# Patient Record
Sex: FEMALE | Race: ASIAN | NOT HISPANIC OR LATINO | Employment: UNEMPLOYED
[De-identification: names, ages, dates, MRNs, and addresses within clinical notes are randomized per-mention and may not be internally consistent; named-entity substitution may affect disease eponyms.]

---

## 2017-08-05 ENCOUNTER — HEALTH MAINTENANCE LETTER (OUTPATIENT)
Age: 32
End: 2017-08-05

## 2018-09-26 ENCOUNTER — OFFICE VISIT (OUTPATIENT)
Dept: FAMILY MEDICINE | Facility: CLINIC | Age: 33
End: 2018-09-26
Payer: COMMERCIAL

## 2018-09-26 VITALS
SYSTOLIC BLOOD PRESSURE: 103 MMHG | RESPIRATION RATE: 20 BRPM | TEMPERATURE: 97.7 F | OXYGEN SATURATION: 98 % | HEART RATE: 84 BPM | DIASTOLIC BLOOD PRESSURE: 69 MMHG | BODY MASS INDEX: 27.09 KG/M2 | WEIGHT: 129.6 LBS

## 2018-09-26 DIAGNOSIS — Z32.00 PREGNANCY EXAMINATION OR TEST, PREGNANCY UNCONFIRMED: Primary | ICD-10-CM

## 2018-09-26 DIAGNOSIS — Z32.01 PREGNANCY TEST POSITIVE: ICD-10-CM

## 2018-09-26 LAB — HCG UR QL: POSITIVE

## 2018-09-26 RX ORDER — PNV NO.95/FERROUS FUM/FOLIC AC 28MG-0.8MG
1 TABLET ORAL DAILY
Qty: 100 TABLET | Refills: 3 | Status: SHIPPED | OUTPATIENT
Start: 2018-09-26

## 2018-09-26 NOTE — PATIENT INSTRUCTIONS
Your pregnancy test is positive today.    We will order a dating US.  That means we will confirm your due date.  Usually the baby is too small to check it's heart, brain, bones, sex, etc.  That will be later in pregnancy.    Daily prenatal vitamin.    We'll get you set up for the long new OB visit with nurse (Kasey) and an OB doctor.  We deliver at [facility].

## 2018-09-26 NOTE — PROGRESS NOTES
Subjective   Kendrick Jacobs is a 33 year old female with a history including chronic low back pain who presents for missed periods.    She is unsure when her last period was, saying it was sometime in  or July.  She usually gets her period around the 12th of the month, so if she had to guess, she would say 18, though she is quite uncertain about this.  With the missed periods, she took a pregnancy test at home which was positive.  She has 6 previous pregnancies, all term with living children, currently ages 9, 10, 11, 13, 14, and 18 - 4 sons and 2 daughters.  No history of miscarriages or abortions.  No history of gestational diabetes or preeclampsia.  She denies any nausea or vomiting, but she has noticed decreased appetite.  No vaginal bleeding.    Social: Non-smoker.    Objective   Vitals: /69  Pulse 84  Temp 97.7  F (36.5  C) (Oral)  Resp 20  Wt 129 lb 9.6 oz (58.8 kg)  LMP 2018 (Approximate)  SpO2 98%  Breastfeeding? No  BMI 27.09 kg/m2  General: Pleasant. Young woman. No distress.  Heart: Regular rate and rhythm. No murmurs, rubs, or gallops.  Lungs: Clear to auscultation bilaterally. No wheezes or crackles. Good air movement.  Psych: Appropriate grooming and hygiene. Speech normal rate. Appropriate mood and affect.    Labs:  Results for orders placed or performed in visit on 18   HCG Qualitative Urine (UPT)  ([facility])   Result Value Ref Range    HCG Qual Urine POSITIVE Negative       Assessment & Plan   Newly diagnosed pregnancy in 33-year-old .  Based on a very uncertain LMP of 18, she would be 10w6d today.  -- Ordered daily prenatal vitamins.  -- Dating US can be done at any time.  -- Establish appointment for initial OB visit.  -- No need for nausea medications.    Return to clinic for initial OB visit and dating US.

## 2018-09-26 NOTE — MR AVS SNAPSHOT
After Visit Summary   2018    Kendrick Jacobs    MRN: [id number]           Patient Information     Date Of Birth          1985        Visit Information        Provider Department      2018 3:30 PM Anat Ferro MD [facility]        Today's Diagnoses     Pregnancy examination or test, pregnancy unconfirmed    -  1    Pregnancy test positive          Care Instructions    Your pregnancy test is positive today.    We will order a dating US.  That means we will confirm your due date.  Usually the baby is too small to check it's heart, brain, bones, sex, etc.  That will be later in pregnancy.    Daily prenatal vitamin.    We'll get you set up for the long new OB visit with nurse (Kasey) and an OB doctor.  We deliver at [facility].          Follow-ups after your visit        Future tests that were ordered for you today     Open Future Orders        Priority Expected Expires Ordered    US OB < 14 Weeks Single Routine  2019            Who to contact     Please call your clinic at [phone] to:    Ask questions about your health    Make or cancel appointments    Discuss your medicines    Learn about your test results    Speak to your doctor            Additional Information About Your Visit        MyChart Information     eco4cloud is an electronic gateway that provides easy, online access to your medical records. With eco4cloud, you can request a clinic appointment, read your test results, renew a prescription or communicate with your care team.     To sign up for eco4cloud visit the website at [email or web address]   You will be asked to enter the access code listed below, as well as some personal information. Please follow the directions to create your username and password.     Your access code is: [email or web address]  Expires: 2018  4:55 PM     Your access code will  in 90 days. If you need help or a new code, please contact your [facility] or call [phone] for assistance.        Care EveryWhere ID     This is your Care EveryWhere ID. This could be used by other organizations to access your [place] medical records  [id number]        Your Vitals Were     Pulse Temperature Respirations Last Period Pulse Oximetry Breastfeeding?    84 97.7  F (36.5  C) (Oral) 20 07/12/2018 (Approximate) 98% No    BMI (Body Mass Index)                   27.09 kg/m2            Blood Pressure from Last 3 Encounters:   09/26/18 103/69   12/31/14 103/72   05/29/13 115/82    Weight from Last 3 Encounters:   09/26/18 129 lb 9.6 oz (58.8 kg)   12/31/14 131 lb 9.6 oz (59.7 kg)   05/29/13 120 lb 11.2 oz (54.7 kg)              We Performed the Following     HCG Qualitative Urine (UPT)  ([facility])          Today's Medication Changes          These changes are accurate as of 9/26/18  4:55 PM.  If you have any questions, ask your nurse or doctor.               Start taking these medicines.        Dose/Directions    Prenatal Vitamin 27-0.8 MG Tabs   Used for:  Pregnancy test positive   Started by:  Anat Ferro MD        Dose:  1 tablet   Take 1 tablet by mouth daily   Quantity:  100 tablet   Refills:  3         Stop taking these medicines if you haven't already. Please contact your care team if you have questions.     levonorgestrel 0.75 MG tablet   Commonly known as:  PLAN B   Stopped by:  Anat Ferro MD                Where to get your medicines      These medications were sent to [facility] - [place], [place] - [address], [place] [address]     Phone:  [phone]     Prenatal Vitamin 27-0.8 MG Tabs                Primary Care Provider    Mira PANCHAL MD       [address] [facility] [address]        Equal Access to Services     URI GUNN AH: Karrie Sherman, watonida lusilvia, qaybta kaalmalucien lira, ana townsend. So [facility] [phone].    ATENCIÓN:  Si habla zane, tiene a ansari disposición servicios gratuitos de asistencia lingüística. Mekhi hicks [phone].    We comply with applicable federal civil rights laws and [place] laws. We do not discriminate on the basis of race, color, national origin, age, disability, sex, sexual orientation, or gender identity.            Thank you!     Thank you for choosing [facility]  for your care. Our goal is always to provide you with excellent care. Hearing back from our patients is one way we can continue to improve our services. Please take a few minutes to complete the written survey that you may receive in the mail after your visit with us. Thank you!             Your Updated Medication List - Protect others around you: Learn how to safely use, store and throw away your medicines at www.disposemymeds.org.          This list is accurate as of 9/26/18  4:55 PM.  Always use your most recent med list.                   Brand Name Dispense Instructions for use Diagnosis    Prenatal Vitamin 27-0.8 MG Tabs     100 tablet    Take 1 tablet by mouth daily    Pregnancy test positive

## 2018-10-01 DIAGNOSIS — Z32.01 PREGNANCY TEST POSITIVE: ICD-10-CM

## 2018-10-02 NOTE — PROGRESS NOTES
US OB <14 WK 10/1/18    CONCLUSION:  1. Single living intrauterine pregnancy at 10 weeks 1 day with EDC of 4/28/19.

## 2018-10-16 ENCOUNTER — HOSPITAL ENCOUNTER (OUTPATIENT)
Dept: ULTRASOUND IMAGING | Facility: CLINIC | Age: 33
Discharge: HOME | End: 2018-10-16
Attending: FAMILY MEDICINE

## 2018-10-16 ENCOUNTER — TELEPHONE (OUTPATIENT)
Dept: FAMILY MEDICINE | Facility: CLINIC | Age: 33
End: 2018-10-16

## 2018-10-16 ENCOUNTER — RECORDS - HEALTHEAST (OUTPATIENT)
Dept: ADMINISTRATIVE | Facility: OTHER | Age: 33
End: 2018-10-16

## 2018-10-16 ENCOUNTER — OFFICE VISIT (OUTPATIENT)
Dept: FAMILY MEDICINE | Facility: CLINIC | Age: 33
End: 2018-10-16
Payer: COMMERCIAL

## 2018-10-16 VITALS
OXYGEN SATURATION: 100 % | WEIGHT: 130.6 LBS | BODY MASS INDEX: 27.3 KG/M2 | DIASTOLIC BLOOD PRESSURE: 61 MMHG | HEART RATE: 86 BPM | RESPIRATION RATE: 20 BRPM | SYSTOLIC BLOOD PRESSURE: 95 MMHG | TEMPERATURE: 97.7 F

## 2018-10-16 DIAGNOSIS — O46.90 VAGINAL BLEEDING DURING PREGNANCY: ICD-10-CM

## 2018-10-16 DIAGNOSIS — O46.90 VAGINAL BLEEDING DURING PREGNANCY: Primary | ICD-10-CM

## 2018-10-16 LAB — B-HCG SERPL-ACNC: ABNORMAL MLU/ML (ref 0–4)

## 2018-10-16 NOTE — TELEPHONE ENCOUNTER
"Pt states that last night she had very mild abd cramping.  She woke up this morning and felt discharge but when looked had bright red vaginal bleeding.  She passed a \"fist\" sized blood clot in the toilet and bleeding has since stopped.  She is 12 weeks pregnant with FAMILIA 4/28/19 based on 10w1d ultrasound done on 10/1/18.  Appt made for today at 1:10 PM with Dr Ferro.  Routed note to Dr Ferro and Dr Klein./NG  "

## 2018-10-16 NOTE — PATIENT INSTRUCTIONS
Step 1: Is this a miscarriage?  -- Ultrasound.  -- Blood test today: hormone level to get a baseline.  On Thursday, come back to lab, recheck that level.  If it's going up, that's what we expect with a continued pregnancy.  If it's going down, then that's a sign of a miscarriage.    Step 2: If it is miscarriage ...  3 options:  -- Wait it out.  In a few weeks, we check your hormone level again to make sure it's gone down to 0.  -- Medication or surgery/procedure if there's tissue that's not all passing (continued bleeding, cramping, hormone level not going down to zero).    OB ULTRASOUND < 14 WEEKS  [id number] [facility]  [place] [address]  [phone]  Date:  Tuesday October 16, 2018  Time:  3:15 PM  Please have a full bladder by drinking 32 ounces of water and refrain from using the bathroom 1 hour prior to appointment.  Patient aware of appointment.  Kimberly Roblero  10/16/18

## 2018-10-16 NOTE — PROGRESS NOTES
Subjective   Kendrick Jacobs is a 33 year old  female at ~13w5d with a history including chronic low back pain who presents with vaginal bleeding in pregnancy.    I last saw her 3 weeks ago on 18, at which time a pregnancy test was positive.  Based on a very uncertain LMP of 18, she would be at 13w5d today.    Starting 4 days ago, she developed a sharp pain in her right lower abdomen.  It was brief, but since then she has had a sensation of a baby kicking her in the lower abdomen.  It is nonpainful.   This morning she awoke to find vaginal bleeding, soaking her bed sheets.  She passed a fist size clots.  She has not had any bleeding since then.  No abdominal pain or cramping with this.  No fevers.  No abdominal trauma, and she has never had any pregnancy loss before.    Social: Non-smoker.    Objective   Vitals: BP 95/61  Pulse 86  Temp 97.7  F (36.5  C) (Oral)  Resp 20  Wt 130 lb 9.6 oz (59.2 kg)  LMP 2018 (Approximate)  SpO2 100%  BMI 27.3 kg/m2  General: Pleasant. Young woman. No distress.  Heart: Regular rate and rhythm. No murmurs, rubs, or gallops.  Lungs: Clear to auscultation bilaterally. No wheezes or crackles. Good air movement.  GI: Abdomen normal to inspection. No ridigidity, distension, or guarding. Normoactive bowel sounds. Soft and non-tender to palpation throughout abdomen.  Pregnancy: Unable to detect fetal heart tones.    Assessment & Plan   33 year old  at a very uncertain 13w5d gestation, presenting with vaginal bleeding, including passage of a fist-sized clot.  Spontaneous  is of greatest concern.  -- Check blood type.  -- Get baseline beta-HCG, then repeat in 48 hours.  -- Ultrasound to evaluate fetal viability.    Return to clinic as scheduled later this week for follow-up.

## 2018-10-16 NOTE — MR AVS SNAPSHOT
After Visit Summary   10/16/2018    Kendrick Jacobs    MRN: [id number]           Patient Information     Date Of Birth          1985        Visit Information        Provider Department      10/16/2018 1:10 PM Anat Ferro MD [facility]        Today's Diagnoses     Vaginal bleeding during pregnancy    -  1      Care Instructions    Step 1: Is this a miscarriage?  -- Ultrasound.  -- Blood test today: hormone level to get a baseline.  On Thursday, come back to lab, recheck that level.  If it's going up, that's what we expect with a continued pregnancy.  If it's going down, then that's a sign of a miscarriage.    Step 2: If it is miscarriage ...  3 options:  -- Wait it out.  In a few weeks, we check your hormone level again to make sure it's gone down to 0.  -- Medication or surgery/procedure if there's tissue that's not all passing (continued bleeding, cramping, hormone level not going down to zero).          Follow-ups after your visit        Your next 10 appointments already scheduled     Oct 19, 2018  1:00 PM CDT   Education with [facility] Ob Educator   [facility] ([facility])    [address] [place] [address] [phone]            Oct 19, 2018  2:30 PM CDT   NEW OB with Mira PANCHAL MD   [facility] ([facility])    [address] [place] [address] [phone]              Future tests that were ordered for you today     Open Future Orders        Priority Expected Expires Ordered    US OB <14 WKS SINGLE OR FIRST GESTATION Routine  10/16/2019 10/16/2018            Who to contact     Please call your clinic at [phone] to:    Ask questions about your health    Make or cancel appointments    Discuss your medicines    Learn about your test results    Speak to your doctor            Additional Information About Your Visit        MyChart Information     eGistics is an electronic gateway that provides easy, online access to your medical records. With  MiMediahart, you can request a clinic appointment, read your test results, renew a prescription or communicate with your care team.     To sign up for [employer] visit the website at [email or web address]   You will be asked to enter the access code listed below, as well as some personal information. Please follow the directions to create your username and password.     Your access code is: [email or web address]  Expires: 2018  4:55 PM     Your access code will  in 90 days. If you need help or a new code, please contact your [facility] or call [phone] for assistance.        Care EveryWhere ID     This is your Care EveryWhere ID. This could be used by other organizations to access your [place] medical records  [id number]        Your Vitals Were     Pulse Temperature Respirations Last Period Pulse Oximetry BMI (Body Mass Index)    86 97.7  F (36.5  C) (Oral) 20 2018 (Approximate) 100% 27.3 kg/m2       Blood Pressure from Last 3 Encounters:   10/16/18 95/61   18 103/69   14 103/72    Weight from Last 3 Encounters:   10/16/18 130 lb 9.6 oz (59.2 kg)   18 129 lb 9.6 oz (58.8 kg)   14 131 lb 9.6 oz (59.7 kg)              We Performed the Following     ABO/Rh Type-HML ([facility])     Beta-HCG Quantitative ([facility])        Primary Care Provider    Mira PANCHAL MD       [address] [place] [address]        Equal Access to Services     SONIYA GUNN : Hadii margareth cuevaso Somarisa, waaxda luqadaha, qaybta kaalmada ana lira. So [facility] [phone].    ATENCIÓN: Si habla español, tiene a ansari disposición servicios gratuitos de asistencia lingüística. Llame al [phone].    We comply with applicable federal civil rights laws and [place] laws. We do not discriminate on the basis of race, color, national origin, age, disability, sex, sexual orientation, or gender identity.            Thank  you!     Thank you for choosing [facility]  for your care. Our goal is always to provide you with excellent care. Hearing back from our patients is one way we can continue to improve our services. Please take a few minutes to complete the written survey that you may receive in the mail after your visit with us. Thank you!             Your Updated Medication List - Protect others around you: Learn how to safely use, store and throw away your medicines at www.disposemymeds.org.          This list is accurate as of 10/16/18  1:49 PM.  Always use your most recent med list.                   Brand Name Dispense Instructions for use Diagnosis    Prenatal Vitamin 27-0.8 MG Tabs     100 tablet    Take 1 tablet by mouth daily    Pregnancy test positive

## 2018-10-17 DIAGNOSIS — O46.90 VAGINAL BLEEDING DURING PREGNANCY: ICD-10-CM

## 2018-10-17 LAB
ABO + RH BLD: NORMAL
REPEAT ABO/RH TYPING (HML): NORMAL

## 2018-10-18 NOTE — PROGRESS NOTES
US OB 10/16/18    CONCLUSION:  1. Single living intrauterine pregnancy at 12 weeks and 2 days.  EDC 4/28/19.  2. Hypoechoic region along the mid uterus is probably a small uterine fibroid.  Subchorionic hemorrhage favored less likely.

## 2018-10-19 ENCOUNTER — TELEPHONE (OUTPATIENT)
Dept: FAMILY MEDICINE | Facility: CLINIC | Age: 33
End: 2018-10-19

## 2018-10-22 DIAGNOSIS — O46.90 VAGINAL BLEEDING DURING PREGNANCY: ICD-10-CM

## 2018-10-22 LAB — B-HCG SERPL-ACNC: ABNORMAL MLU/ML (ref 0–4)

## 2018-10-23 ENCOUNTER — OFFICE VISIT (OUTPATIENT)
Dept: FAMILY MEDICINE | Facility: CLINIC | Age: 33
End: 2018-10-23
Payer: COMMERCIAL

## 2018-10-23 ENCOUNTER — TELEPHONE (OUTPATIENT)
Dept: FAMILY MEDICINE | Facility: CLINIC | Age: 33
End: 2018-10-23

## 2018-10-23 VITALS
RESPIRATION RATE: 16 BRPM | SYSTOLIC BLOOD PRESSURE: 93 MMHG | HEART RATE: 71 BPM | OXYGEN SATURATION: 99 % | BODY MASS INDEX: 27.59 KG/M2 | DIASTOLIC BLOOD PRESSURE: 61 MMHG | WEIGHT: 132 LBS | TEMPERATURE: 98 F

## 2018-10-23 DIAGNOSIS — N93.9 VAGINAL BLEEDING: Primary | ICD-10-CM

## 2018-10-23 DIAGNOSIS — D62 ANEMIA DUE TO BLOOD LOSS, ACUTE: ICD-10-CM

## 2018-10-23 DIAGNOSIS — Z23 NEED FOR VACCINATION: ICD-10-CM

## 2018-10-23 LAB
% GRANULOCYTES: 70.9 %G (ref 40–75)
GRANULOCYTES #: 5.2 K/UL (ref 1.6–8.3)
HCT VFR BLD AUTO: 30.6 % (ref 35–47)
HEMOGLOBIN: 9.3 G/DL (ref 11.7–15.7)
LYMPHOCYTES # BLD AUTO: 1.7 K/UL (ref 0.8–5.3)
LYMPHOCYTES NFR BLD AUTO: 22.9 %L (ref 20–48)
MCH RBC QN AUTO: 22.5 PG (ref 26.5–35)
MCHC RBC AUTO-ENTMCNC: 30.4 G/DL (ref 32–36)
MCV RBC AUTO: 73.9 FL (ref 78–100)
MID #: 0.5 K/UL (ref 0–2.2)
MID %: 6.2 %M (ref 0–20)
PLATELET # BLD AUTO: 293 K/UL (ref 150–450)
RBC # BLD AUTO: 4.1 M/UL (ref 3.8–5.2)
WBC # BLD AUTO: 7.4 K/UL (ref 4–11)

## 2018-10-23 ASSESSMENT — ANXIETY QUESTIONNAIRES
5. BEING SO RESTLESS THAT IT IS HARD TO SIT STILL: NOT AT ALL
3. WORRYING TOO MUCH ABOUT DIFFERENT THINGS: SEVERAL DAYS
1. FEELING NERVOUS, ANXIOUS, OR ON EDGE: SEVERAL DAYS
6. BECOMING EASILY ANNOYED OR IRRITABLE: SEVERAL DAYS
IF YOU CHECKED OFF ANY PROBLEMS ON THIS QUESTIONNAIRE, HOW DIFFICULT HAVE THESE PROBLEMS MADE IT FOR YOU TO DO YOUR WORK, TAKE CARE OF THINGS AT HOME, OR GET ALONG WITH OTHER PEOPLE: SOMEWHAT DIFFICULT
2. NOT BEING ABLE TO STOP OR CONTROL WORRYING: SEVERAL DAYS
7. FEELING AFRAID AS IF SOMETHING AWFUL MIGHT HAPPEN: NOT AT ALL
GAD7 TOTAL SCORE: 5

## 2018-10-23 ASSESSMENT — PATIENT HEALTH QUESTIONNAIRE - PHQ9: 5. POOR APPETITE OR OVEREATING: SEVERAL DAYS

## 2018-10-23 NOTE — PROGRESS NOTES
Preceptor Attestation:   Patient seen, evaluated and discussed with the resident. I have verified the content of the note, which accurately reflects my assessment of the patient and the plan of care.   Supervising Physician:  Josselin Jeffrey MD

## 2018-10-23 NOTE — TELEPHONE ENCOUNTER
Pt is +13 weeks and states that she took her kids to school and then went back to bed.  She states that she woke up and noticed bright red vaginal bleeding.  She states that she then voided in the toilet and had passed 2 golf ball size blood clots.  She denies cramping.  This is the second episode of vaginal bleeding during this pregnancy with the first one being on 10/16-10/17 where she had passed a fist size blood clot.  Her quant on 10/16 was 56,896 and repeat yesterday was 61,166.  Her OB ultrasound on 10/16/18 showed single living IUP.  There was a hypoechoic area along the mid uterus that radiologist says is a small uterine fibroid with a subch hemorrhage less likely.  Told pt that she should come in for eval and appt made for today at 10:20 am with Dr Perales./JOVAN

## 2018-10-23 NOTE — PATIENT INSTRUCTIONS
Subchorionic hemorhage as one possibility however this is unclear and there was a fibroid (muscle mass on the uterus.)    1. Blood draw today  2. Recheck US in about 1.5 weeks  3. Keep new OB  4. Baby seen today with a heartbeat            Image from Up To Date. This is NOT your image.

## 2018-10-23 NOTE — MR AVS SNAPSHOT
After Visit Summary   10/23/2018    Kendrick Jacobs    MRN: [id number]           Patient Information     Date Of Birth          1985        Visit Information        Provider Department      10/23/2018 10:20 AM Merissa Perales MD [facility]        Today's Diagnoses     Vaginal bleeding    -  1      Care Instructions    Subchorionic hemorhage as one possibility however this is unclear and there was a fibroid (muscle mass on the uterus.)    1. Blood draw today  2. Recheck US in about 1.5 weeks  3. Keep new OB  4. Baby seen today with a heartbeat            Image from Up To Date. This is NOT your image.           Follow-ups after your visit        Your next 10 appointments already scheduled     Nov 08, 2018  1:00 PM CST   Education with [facility] Ob Educator   [facility] ([facility])    [address] [place] [id number] [phone]            Nov 08, 2018  2:30 PM CST   NEW OB with Mira PANCHAL MD   [facility] ([facility])    [address] [place] [address]   [phone]              Future tests that were ordered for you today     Open Future Orders        Priority Expected Expires Ordered    US OB <14 WKS SINGLE OR FIRST GESTATION Routine 10/31/2018 10/23/2019 10/23/2018            Who to contact     Please call your clinic at [phone] to:    Ask questions about your health    Make or cancel appointments    Discuss your medicines    Learn about your test results    Speak to your doctor            Additional Information About Your Visit        MyChart Information     Foodlvet is an electronic gateway that provides easy, online access to your medical records. With Heidi Shaulis, you can request a clinic appointment, read your test results, renew a prescription or communicate with your care team.     To sign up for [employer] visit the website at [email or web address]   You will be asked to enter the access code listed below, as well as some personal  information. Please follow the directions to create your username and password.     Your access code is: [email or web address]  Expires: 2018  4:55 PM     Your access code will  in 90 days. If you need help or a new code, please contact your [facility] or call [phone] for assistance.        Care EveryWhere ID     This is your Care EveryWhere ID. This could be used by other organizations to access your [place] medical records  [id number]        Your Vitals Were     Pulse Temperature Respirations Last Period Pulse Oximetry BMI (Body Mass Index)    71 98  F (36.7  C) (Oral) 16 2018 (Approximate) 99% 27.59 kg/m2       Blood Pressure from Last 3 Encounters:   10/23/18 93/61   10/16/18 95/61   18 103/69    Weight from Last 3 Encounters:   10/23/18 132 lb (59.9 kg)   10/16/18 130 lb 9.6 oz (59.2 kg)   18 129 lb 9.6 oz (58.8 kg)              We Performed the Following     CBC with Diff Plt (P FM)        Primary Care Provider    Mira PANCHAL MD       [address] [address]        Equal Access to Services     SONIYA GUNN : Hadii margareth ku hadasho Soomaali, waaxda luqadaha, qaybta kaalmada adeegyada, ana keller . So [facility] [phone].    ATENCIÓN: Si habla español, tiene a ansari disposición servicios gratuitos de asistencia lingüística. Llame al [phone].    We comply with applicable federal civil rights laws and [place] laws. We do not discriminate on the basis of race, color, national origin, age, disability, sex, sexual orientation, or gender identity.            Thank you!     Thank you for choosing [facility]  for your care. Our goal is always to provide you with excellent care. Hearing back from our patients is one way we can continue to improve our services. Please take a few minutes to complete the written survey that you may receive in the mail after your visit with us. Thank you!              Your Updated Medication List - Protect others around you: Learn how to safely use, store and throw away your medicines at www.disposemymeds.org.          This list is accurate as of 10/23/18 11:16 AM.  Always use your most recent med list.                   Brand Name Dispense Instructions for use Diagnosis    Prenatal Vitamin 27-0.8 MG Tabs     100 tablet    Take 1 tablet by mouth daily    Pregnancy test positive

## 2018-10-23 NOTE — NURSING NOTE
Injectable influenza vaccine documentation    1. Has the patient received the information for the influenza vaccine? YES    2. Does the patient have a severe allergy to eggs (Patients with a severe egg allergy should be assessed by a medical provider, RN, or clinical pharmacist. If they receive the influenza vaccine, please have them observed for 15 minutes.)? No    3. Has the patient had an allergic reaction to previous influenza vaccines? No    4. Has the patient had any severe allergic reactions to past influenza vaccines ? No       5. Does patient have a history of Guillain-[place] syndrome? No        Based on responses above, I administered the influenza vaccine.  Leyla Hankins, CMA

## 2018-10-24 PROBLEM — D62 ANEMIA DUE TO BLOOD LOSS, ACUTE: Status: ACTIVE | Noted: 2018-10-24

## 2018-10-24 RX ORDER — FERROUS SULFATE 325(65) MG
325 TABLET ORAL
Qty: 30 TABLET | Refills: 2 | Status: SHIPPED | OUTPATIENT
Start: 2018-10-24 | End: 2018-11-14

## 2018-10-24 ASSESSMENT — ANXIETY QUESTIONNAIRES: GAD7 TOTAL SCORE: 5

## 2018-10-24 ASSESSMENT — PATIENT HEALTH QUESTIONNAIRE - PHQ9: SUM OF ALL RESPONSES TO PHQ QUESTIONS 1-9: 2

## 2018-10-24 NOTE — PROGRESS NOTES
Called and discussed results. Iron supplement sent to pharmacy. Patient will follow up next week for first OB visit.   Merissa Perales, DO  PGY3

## 2018-10-29 DIAGNOSIS — O09.90 HIGH-RISK PREGNANCY, UNSPECIFIED TRIMESTER: Primary | ICD-10-CM

## 2018-10-29 DIAGNOSIS — O09.90 HIGH-RISK PREGNANCY, UNSPECIFIED TRIMESTER: ICD-10-CM

## 2018-10-30 NOTE — PROGRESS NOTES
Left message on voicemail regarding US. No results left on VM. Overall normal however placenta previa noted. Will discuss at next visit. This could have caused the vaginal bleeding and will be monitored during pregnancy.   Merissa Perales, DO  PGY3

## 2018-11-12 ENCOUNTER — OFFICE VISIT (OUTPATIENT)
Dept: FAMILY MEDICINE | Facility: CLINIC | Age: 33
End: 2018-11-12
Payer: COMMERCIAL

## 2018-11-12 ENCOUNTER — ALLIED HEALTH/NURSE VISIT (OUTPATIENT)
Dept: FAMILY MEDICINE | Facility: CLINIC | Age: 33
End: 2018-11-12
Payer: COMMERCIAL

## 2018-11-12 ENCOUNTER — MEDICAL CORRESPONDENCE (OUTPATIENT)
Dept: HEALTH INFORMATION MANAGEMENT | Facility: CLINIC | Age: 33
End: 2018-11-12

## 2018-11-12 VITALS
BODY MASS INDEX: 28.04 KG/M2 | HEART RATE: 89 BPM | HEIGHT: 58 IN | WEIGHT: 133.6 LBS | TEMPERATURE: 97.8 F | DIASTOLIC BLOOD PRESSURE: 61 MMHG | RESPIRATION RATE: 16 BRPM | SYSTOLIC BLOOD PRESSURE: 92 MMHG

## 2018-11-12 DIAGNOSIS — K59.03 DRUG-INDUCED CONSTIPATION: ICD-10-CM

## 2018-11-12 DIAGNOSIS — O09.90 SUPERVISION OF HIGH RISK PREGNANCY, ANTEPARTUM: ICD-10-CM

## 2018-11-12 DIAGNOSIS — Z11.51 SCREENING FOR HUMAN PAPILLOMAVIRUS: ICD-10-CM

## 2018-11-12 DIAGNOSIS — Z34.82 ENCOUNTER FOR SUPERVISION OF OTHER NORMAL PREGNANCY IN SECOND TRIMESTER: Primary | ICD-10-CM

## 2018-11-12 DIAGNOSIS — O99.019 IRON DEFICIENCY ANEMIA DURING PREGNANCY, ANTEPARTUM: ICD-10-CM

## 2018-11-12 DIAGNOSIS — O44.02 PLACENTA PREVIA ANTEPARTUM IN SECOND TRIMESTER: ICD-10-CM

## 2018-11-12 DIAGNOSIS — O28.3 ABNORMAL PREGNANCY US: ICD-10-CM

## 2018-11-12 DIAGNOSIS — O46.90 VAGINAL BLEEDING DURING PREGNANCY, ANTEPARTUM: ICD-10-CM

## 2018-11-12 DIAGNOSIS — O09.40 GRAND MULTIPARITY WITH CURRENT PREGNANCY: ICD-10-CM

## 2018-11-12 DIAGNOSIS — D64.9 ANEMIA, UNSPECIFIED TYPE: ICD-10-CM

## 2018-11-12 DIAGNOSIS — D62 ANEMIA DUE TO BLOOD LOSS, ACUTE: ICD-10-CM

## 2018-11-12 DIAGNOSIS — D50.9 IRON DEFICIENCY ANEMIA DURING PREGNANCY, ANTEPARTUM: ICD-10-CM

## 2018-11-12 LAB
BACTERIA: NORMAL
BILIRUBIN UR: NEGATIVE
BLOOD UR: NEGATIVE
CLUE CELLS: NORMAL
FERRITIN SERPL-MCNC: 6 NG/ML (ref 10–130)
GLUCOSE URINE: NEGATIVE
HCT VFR BLD AUTO: 32.3 % (ref 35–47)
HEMOGLOBIN: 9.5 G/DL (ref 11.7–15.7)
HIV 1+2 AB+HIV1 P24 AG SERPL QL IA: NEGATIVE
IRON SATN MFR SERPL: 7 % (ref 20–50)
IRON SERPL-MCNC: 31 UG/DL (ref 42–175)
KETONES UR QL: NEGATIVE
LEUKOCYTE ESTERASE UR: NEGATIVE
MCH RBC QN AUTO: 22.7 PG (ref 26.5–35)
MCHC RBC AUTO-ENTMCNC: 29.4 G/DL (ref 32–36)
MCV RBC AUTO: 77.3 FL (ref 78–100)
MOTILE TRICHOMONAS: NEGATIVE
NITRITE UR QL STRIP: NEGATIVE
ODOR: NORMAL
PH UR STRIP: 6 [PH] (ref 5–7)
PH WET PREP: NORMAL
PLATELET # BLD AUTO: 298 K/UL (ref 150–450)
PROTEIN UR: NEGATIVE
RBC # BLD AUTO: 4.2 M/UL (ref 3.8–5.2)
SP GR UR STRIP: >=1.03
TIBC SERPL-MCNC: 471 UG/DL (ref 313–563)
TRANSFERRIN SERPL-MCNC: 377 MG/DL (ref 212–360)
UROBILINOGEN UR STRIP-ACNC: NORMAL
WBC # BLD AUTO: 6.9 K/UL (ref 4–11)
WBC WET PREP: NORMAL
YEAST: NORMAL

## 2018-11-12 NOTE — PROGRESS NOTES
Preceptor Attestation:   Patient seen, evaluated and discussed with the resident. I have verified the content of the note, which accurately reflects my assessment of the patient and the plan of care.   Supervising Physician:  Thierry Reece MD

## 2018-11-12 NOTE — PROGRESS NOTES
Past Medical History     Do you have a history of any of the following medical conditions?    Condition No/Yes/Details   Hypertension No    Heart disease, mitral valve prolapse, rheumatic fever No    Asthma or another chronic lung disease No    An autoimmune disorder No    Kidney disease No    Frequent urinary tract infections No    Epilepsy, seizures, or spells No    Migraine headaches No    Stroke, loss of sensation/function, seizures, or other neuro problem No    Diabetes No    Thyroid problems or have you taken thyroid medication No    Hepatitis, liver disease, jaundice No    Blood clots, phlebitis, pulmonary embolism or varicose veins No    Excessive bleeding after surgery or dental work No    Do you have more bleeding than other women after a cut or a scratch? No    Anemia  YES during pregnancy taking PNV's and iron once a day   Blood transfusions No         Would you refuse a blood transfusion?       No   If yes, then ask next question. If no, skip next question.   Would you rather die than receive a blood transfusion? No    Breast problems No    Have you ever ? No - plans to bottle feed   Abnormalities of the uterus No    Abnormal pap smear No    Have you ever been treated for depression? No    Are you having problems with crying spells or loss of self-esteem? No    Have you ever required psychiatric care? No    Have you been physically, sexually, or emotionally hurt by someone? No    Have you been in a major accident or suffered serious trauma? No    Have you ever had any gynecological surgical procedures such as cervical conization, LEEP, laser treatment, cryosurgery of the cervix, or a dilatation and curettage? No    Have you had any other surgical procedures? No         Have you ever had any complications from anesthesia? No    Have you ever been hospitalized for a nonsurgical reason? No             Substance use and exposure     Does anyone in your home smoke? No    Do you use tobacco  products or betel nut? No    Do you drink beer, wine, or hard liquor? No    Do you use any of the following: marijuana, speed, cocaine, heroin, hallucinogens, or other drugs? No               Symptoms since Last Menstrual Period     Do you currently have any of the following symptoms: No/Yes/Details        Abdominal pain  YES uncomfortable bloating across abdomen at least once a day and lasts 1-2 hours at a time        Blood in the stools or urine No         Chest pain No         Shortness of breath No         coughing or vomiting up blood No         Your heart racing or skipping beats No         Nausea or vomiting No         Pain on urination No         Vaginal discharge or bleeding  YES 3 or 4 episodes of bright red vaginal bleeding last around 10/24               Genetic Screening          Is the patient 35 years or older? No      Do you have a history of any of the following No/Yes/Details        A metabolic disorder (e.g. Insulin-dependent DM, PKU) No         Recurrent pregnancy loss or still birth No      Do you, the baby's father, or anyone in your families have          Thalassemia AND MCV <80 No         Hemophilia No         Neural tube defect No }        Congenital heart defect No         Sickle cell disease or trait No         Muscular dystrophy No         Cystic fibrosis No         Mental retardation or autism No         Down's syndrome No         Clinton-Sach's disease No         [place]'s chorea No         Any other inherited genetic or chromosomal disorder No         A child with birth defects not listed above No                Infection History     Ever treated for tuberculosis or had a positive skin test No    Ever had genital herpes (or has your partner) No    Had a rash or viral illness since LMP No    Ever had a sexually transmitted infection No    Ever had chicken pox or the vaccine  YES had chicken pox   Have you had a sexual partner who is HIV positive No    Ever had any other serious  infectious disease No                Risk Assessment     Average Risk Category  No significant risk factors:Yes    At Risk Category (up to 3)  Teen pregnancy: No  Poor social situation: No  Domestic abuse: No  Financial difficulties: No  Smoker: No  H/O  deliver: No  H/O drug abuse: No  Non-English speaking: No  Advanced maternal age: No  GDM risks: No  Previous C/S: No  H/O PIH: No  H/O STIs: No  H/O mental health concerns: No  Onset care > 20 weeks: No      High Risk Category (4 or more At Risk or)  Diabetes/GDM: No  Multiple gestation: No  Chronic hypertension: No  Significant hx of asthma: No  Fetal demise > 20 weeks: No  Positive tox screen: No  Current mental health treatment: No  Other: Placenta Previa      Risk: High Risk   Date determined: 18

## 2018-11-12 NOTE — PROGRESS NOTES
First Obstetric Visit       HPI       Kendrick Jacobs is a 33 year old woman who presents for an initial prenatal visit at 16w1d weeks of pregnancy with FAMILIA of 2019 by LMP of Patient's last menstrual period was 2018 (approximate)..      She has had bleeding since her LMP.  The bleeding  was bright red, in large, on 4 occasions, and was not accompanied by cramping...   She has not had nausea.   Weight loss has not occurred.    This was not a planned pregnancy.     OTHER CONCERNS: Some abdominal bloating with tightening and it's very uncomfortable. Did not happen with prior pregnancies. Not associated with any particular foods. Becoming more frequent. Will walk around when it happens, and has tried stretching. Doesn't matter if she's full or hungry.     Have you travelled during the pregnancy?No  Have your sexual partner(s) travelled during the pregnancy?No              Labor Risk Assessment     Is the patient's age <18 or >40?     No  Patint's BMI is Body mass index is 27.68 kg/(m^2).   Does patient have a BMI < 18.5?     No  Prior delivery within 6 months?      No  Ever delivered prior to 37 weeks gestation?  No  Pregnancy occur via In Vitro Fertilization?   No  Are you carrying twins?       No    The patient has the following additional risk factors for  labor:   none     Labor Risk Summary:  Pt is high risk for  Labor  No                    Past Medical History     Past Medical History:   Diagnosis Date     NO ACTIVE PROBLEMS      See nurse history note, reviewed in detail.             Current Medications      Current Outpatient Prescriptions   Medication Sig Dispense Refill     ferrous sulfate (IRON) 325 (65 Fe) MG tablet Take 1 tablet (325 mg) by mouth daily (with breakfast) 30 tablet 2     Prenatal Vit-Fe Fumarate-FA (PRENATAL VITAMIN) 27-0.8 MG TABS Take 1 tablet by mouth daily 100 tablet 3             Review of Systems      ROS:  No - Headache  No - Changes in vision  No  "- Chest Pain  No - Shortness of Breath  No - Nausea   No - Vomiting  YES - Abdominal pain   No - Contractions  No - Dysuria   No - Vaginal Discharge    YES - Vaginal bleeding   No - Loss of Fluid   No - Extremity swelling     ====================================================           Physical Exam      BP 92/61  Pulse 89  Temp 97.8  F (36.6  C)  Resp 16  Ht 4' 10.25\" (148 cm)  Wt 133 lb 9.6 oz (60.6 kg)  LMP 07/12/2018 (Approximate)  BMI 27.68 kg/m2  GENERAL: healthy, alert and no distress  EYES: Eyes grossly normal to inspection, extraocular movements - intact, and PERRL  NECK: no tenderness, no adenopathy, no asymmetry, no masses, no stiffness; thyroid- normal to palpation  RESP: lungs clear to auscultation - no rales, no rhonchi, no wheezes  CV: regular rates and rhythm, normal S1 S2, no S3 or S4 and no murmur, no click or rub -  ABDOMEN: soft, no tenderness, no  hepatosplenomegaly, no masses, normal bowel sounds   MS: extremities- no gross deformities noted, no edema  SKIN: no suspicious lesions, no rashes  - female: cervix- normal, adnexae- normal; uterus- normal, no masses, no discharge  No scars noted.. -154  GENITALIA:  BUS WNL, no lesions noted   VAGINA:  Pink, normal rugae and discharge normal and physiologic  CERVIX:  smooth, without discharge or CMT and parous os,   firm/ closed 4 cm long.  UTERUS: Anteverted, nontender 15 weeks in size.  ADNEXA: Without masses or tenderness    Past labs reviewed:  A POS  Varicella immune No  Hepatitis B immune Yes  Last pap 2013.  She is due for this today.    =========================================         Assessment and Plan     Kendrick was seen today for prenatal care.    Diagnoses and all orders for this visit:    Encounter for supervision of other normal pregnancy in second trimester  -     ABO/Rh Type-HML ([email or web address])  -     Antibody Screen ([email or web address])  -     Chlamydia/Gono Amplified ([email or web address])  -     CBC with Plt ([facility])  -     Culture " Urine ([facility])  -     Hepatitis B Surface Ag ([facility])  -     HIV Ag/Ab Screen [place] ([facility])  -     Lead, Blood ([facility])  -     Rubella  IgG ([facility])  -     Syphilis Screen [place] ([facility])  -     Urinalysis([facility])  -     Prema Zoster Imm Status Franny ([facility])  -     Hepatitis B Surface Ab ([facility])  -     Glycosylated Hgb A1C ([facility])  -     GYN Cytology ([facility])  -     Wet Prep ([facility])    Anemia, unspecified type  -     Iron Transferrin Saturat ([facility])  -     Ferritin ([facility])    Screening for human papillomavirus  -     GYN Cytology ([facility])        33 year old  , 16w1d weeks of pregnancy with FAMILIA of 2019 by LMP of Patient's last menstrual period was 2018 (approximate)..      Pregnancy Risk Assessment: Average risk pregnancy  Discussed high risk conditions as follows: none    -Dating US obtained and dating confirmed?   YES  -Taking PNV/Folate?     YES      - Ordered new OB labs: blood type and antibody screen, HIV, VDRL, hep B, rubella, UA/UC, gonorrhea/chlamydia, Pap smear, Hepatitis B immunity, Varicella immunity and Wet prep done today.    -Discussed risks and benefits of second trimester quad screen fro trisomies, patient declined or was at too advanced a gestational age for testing.   - Did not discuss genetic screening. Plan to discuss at next visit.  - Discussed screening for sickle cell anemia. Patient does not  want to pursue Hb electrophoresis.     - Discussed early screening for gestational diabetes. The patient does have a history of GDM, BMI>30, h/o prediabetes/glucose intolerance, first degree relative with GDM or DM, or chronic HTN, so WILL obtain early GCT or A1c.    - The patient does not h/o severe, early preeclampsia with delivery <34weeks, preeclampsia in more than one pregnancy, pre-gestational diabetes, chronic hypertension, renal disease, or autoimmune disease so WILL NOT start low dose aspirin (81mg) and calcium  supplementation (1g-1.5g/day elemental = 2500mg- 3750mg/day Calcium carbonate) to prevent preeclampsia.    - The patient  does not have a history of spontaneous  birth so  WILL NOT consider progesterone starting at 16-20 weeks and/or serial transvaginal cervical length ultrasounds from 16-24 weeks.     - Prenatal vitamins ordered.  - Flu shot offered and was Accepted.    Counseling given:   - Follow up in 4 weeks for return OB visit.  - Recommended weight gain for pregnancy: 15-25 lbs (pregravid BMI 25-29.9)      - Instructed on best evidence for: healthy diet and foods to avoid; exercise and activity during pregnancy; avoiding exposure to toxoplasmosis; safe use of seatbelts during pregnancy; and maintenance of a generally healthy lifestyle  -Patient to see OB educator/ RN today and/or next visit.  - Try GasX for abdominal pain  - Ferrous sulfate for anemia  - Fetal scan in 4 weeks    Discussed the harms, benefits, side effects and alternative therapies for current prescribed and OTC medications.      There are no Patient Instructions on file for this visit.    Options for treatment and follow-up care were reviewed with the patient and/or guardian. Kendrick Jacobs and/or guardian engaged in the decision making process and verbalized understanding of the options discussed and agreed with the final plan.    Mira Klein MD PGY1

## 2018-11-12 NOTE — MR AVS SNAPSHOT
"              After Visit Summary   11/12/2018    Kendrick Jacobs    MRN: [id number]           Patient Information     Date Of Birth          1985        Visit Information        Provider Department      11/12/2018 2:30 PM Mira Klein MD [facility]        Today's Diagnoses     Encounter for supervision of other normal pregnancy in second trimester    -  1    Anemia, unspecified type        Screening for human papillomavirus        Drug-induced constipation        Anemia due to blood loss, acute           Follow-ups after your visit        Follow-up notes from your care team     Return in about 4 weeks (around 12/10/2018).      Your next 10 appointments already scheduled     Dec 10, 2018 11:20 AM CST   ULTRASOUND with Destini Rayningham   [facility] ([facility])    [address] [place] [id number]   [phone]            Dec 13, 2018  1:30 PM CST   RETURN OB with Mira PANCHAL MD   [facility] ([facility])    [address] [place] [address]   [phone]              Who to contact     Please call your clinic at [phone] to:    Ask questions about your health    Make or cancel appointments    Discuss your medicines    Learn about your test results    Speak to your doctor            Additional Information About Your Visit        Care EveryWhere ID     This is your Care EveryWhere ID. This could be used by other organizations to access your [facility] medical records  [id number]        Your Vitals Were     Pulse Temperature Respirations Height Last Period BMI (Body Mass Index)    89 97.8  F (36.6  C) 16 4' 10.25\" (148 cm) 07/12/2018 (Approximate) 27.68 kg/m2       Blood Pressure from Last 3 Encounters:   11/12/18 92/61   10/23/18 93/61   10/16/18 95/61    Weight from Last 3 Encounters:   11/12/18 133 lb 9.6 oz (60.6 kg)   10/23/18 132 lb (59.9 kg)   10/16/18 130 lb 9.6 oz (59.2 kg)              We Performed the Following     ABO/Rh Type-HML ([facility])     Antibody " Screen ([facility])     CBC with Plt ([facility])     Chlamydia/Gono Amplified ([facility])     Culture Urine ([facility])     Ferritin ([facility])     Glycosylated Hgb A1C ([facility])     GYN Cytology ([facility])     Hepatitis B Surface Ab ([facility])     Hepatitis B Surface Ag ([facility])     HIV Ag/Ab Screen [place] ([facility])     HPV Gay PCR ([facility])     Iron Transferrin Saturat ([facility])     Lead With Demographics ([facility])     Lead, Blood ([facility])     Rubella  IgG ([facility])     Syphilis Screen [place] ([facility])     Urinalysis([facility])     Prema Zoster Imm Status Franny ([facility])     Wet Prep ([facility])          Today's Medication Changes          These changes are accurate as of 11/12/18 11:59 PM.  If you have any questions, ask your nurse or doctor.               Start taking these medicines.        Dose/Directions    docusate sodium 100 MG tablet   Commonly known as:  COLACE   Used for:  Drug-induced constipation   Started by:  Mira Klein MD        Dose:  100 mg   Take 100 mg by mouth daily   Quantity:  60 tablet   Refills:  1         These medicines have changed or have updated prescriptions.        Dose/Directions    ferrous sulfate 325 (65 Fe) MG tablet   Commonly known as:  IRON   This may have changed:  when to take this   Used for:  Anemia due to blood loss, acute   Changed by:  Mira Klein MD        Dose:  325 mg   Take 1 tablet (325 mg) by mouth 2 times daily   Quantity:  30 tablet   Refills:  3            Where to get your medicines      These medications were sent to [facility] - [place], [place] - [address], [place] [address]     Phone:  [phone]     docusate sodium 100 MG tablet    ferrous sulfate 325 (65 Fe) MG tablet                Primary Care Provider    Mira PANCHAL MD       [address] [facility] [address]        Equal Access to Services     URI GUNN AH: Karrie Sherman  eula perkins, reneeta kaheidi lira, ana yoonin hayaan litosuki pippaso laSumaaaedda ah. So [facility] [phone].    ATENCIÓN: Si daniela zane, tiene a ansari disposición servicios gratuitos de asistencia lingüística. Llame al [phone].    We comply with applicable federal civil rights laws and [place] laws. We do not discriminate on the basis of race, color, national origin, age, disability, sex, sexual orientation, or gender identity.            Thank you!     Thank you for choosing [facility]  for your care. Our goal is always to provide you with excellent care. Hearing back from our patients is one way we can continue to improve our services. Please take a few minutes to complete the written survey that you may receive in the mail after your visit with us. Thank you!             Your Updated Medication List - Protect others around you: Learn how to safely use, store and throw away your medicines at www.disposemymeds.org.          This list is accurate as of 11/12/18 11:59 PM.  Always use your most recent med list.                   Brand Name Dispense Instructions for use Diagnosis    docusate sodium 100 MG tablet    COLACE    60 tablet    Take 100 mg by mouth daily    Drug-induced constipation       ferrous sulfate 325 (65 Fe) MG tablet    IRON    30 tablet    Take 1 tablet (325 mg) by mouth 2 times daily    Anemia due to blood loss, acute       Prenatal Vitamin 27-0.8 MG Tabs     100 tablet    Take 1 tablet by mouth daily    Pregnancy test positive

## 2018-11-13 PROBLEM — O46.90 VAGINAL BLEEDING DURING PREGNANCY, ANTEPARTUM: Status: ACTIVE | Noted: 2018-10-24

## 2018-11-13 PROBLEM — O44.02 PLACENTA PREVIA ANTEPARTUM IN SECOND TRIMESTER: Status: ACTIVE | Noted: 2018-10-29

## 2018-11-13 PROBLEM — O09.90 SUPERVISION OF HIGH RISK PREGNANCY, ANTEPARTUM: Status: ACTIVE | Noted: 2018-11-12

## 2018-11-13 PROBLEM — O09.40 GRAND MULTIPARITY WITH CURRENT PREGNANCY: Status: ACTIVE | Noted: 2018-11-12

## 2018-11-13 LAB
ABO + RH BLD: NORMAL
BLD GP AB SCN SERPL QL: NEGATIVE
C TRACH RRNA CVX QL NAA+PROBE: NEGATIVE
COLLECTION METHOD: NORMAL
CULTURE: NORMAL
FINAL DIAGNOSIS: ABNORMAL
HBA1C MFR BLD: 5.1 % (ref 4.2–6.1)
HBV SURFACE AB SER-ACNC: NEGATIVE M[IU]/ML
HBV SURFACE AG SERPL QL IA: NEGATIVE
HPV 16 DNA: NEGATIVE
HPV 18 DNA: NEGATIVE
HPV SOURCE: ABNORMAL
LEAD BLD-MCNC: NORMAL UG/DL
LEAD RETEST: NO
N GONORRHOEA RRNA SPEC QL NAA+PROBE: NEGATIVE
OTHER HR HPV: POSITIVE
REPEAT ABO/RH TYPING (HML): NORMAL
RUBV IGG SERPL QL IA: POSITIVE
SPECIMEN DESCRIPTION: ABNORMAL
TREPONEMA ANTIBODY (SYPHILIS): NEGATIVE

## 2018-11-14 PROBLEM — O99.019 IRON DEFICIENCY ANEMIA DURING PREGNANCY, ANTEPARTUM: Status: ACTIVE | Noted: 2018-11-12

## 2018-11-14 PROBLEM — D50.9 IRON DEFICIENCY ANEMIA DURING PREGNANCY, ANTEPARTUM: Status: ACTIVE | Noted: 2018-11-12

## 2018-11-14 LAB — VZV IGG SER QL IF: POSITIVE

## 2018-11-14 RX ORDER — ASPIRIN 81 MG
100 TABLET, DELAYED RELEASE (ENTERIC COATED) ORAL DAILY
Qty: 60 TABLET | Refills: 1 | Status: SHIPPED | OUTPATIENT
Start: 2018-11-14 | End: 2018-12-17

## 2018-11-14 RX ORDER — FERROUS SULFATE 325(65) MG
325 TABLET ORAL 2 TIMES DAILY
Qty: 30 TABLET | Refills: 3 | Status: SHIPPED | OUTPATIENT
Start: 2018-11-14 | End: 2019-02-25

## 2018-11-14 NOTE — NURSING NOTE
Family Medicine OB Education    I provided the following OB education to Kendrick Jacobs.    Discussed that Dr Klein should be the doctor that she sees for her prenatal visits and that Dr Klein will try hard to be the one to deliver her baby.  Discussed that if Dr Klein was unavailable that one of our other physicians would deliver the baby and that this could be a male or female provider.  Briefly discussed residency program and that multiple doctors would be present at time of delivery.  Sheet given and discussed fetal growth and development.  Sheet given and discussed warning signs with reasons to call clinic, L&D, or 24 hr HE  line with questions or concerns (phone numbers given).  Sheet given and discussed Tdap to be given after 27 weeks gestation and the importance of family members get immunized before delivery.  Proof of pregnancy completed and given to pt.  Gave pt preregistration form for hospital to complete.  See questionnaire and pregnancy risk assessment for further information in provider encounter.     Legal Screener completed and there were no concerns for government benefits, housing, or immigration.      Name of provider who requested the OB education: Dr Klein  Name of provider on site (faculty or community preceptor) at the time of performing the OB education: Dr Chevy Dhillon, RN BSN

## 2018-11-14 NOTE — PROGRESS NOTES
Called Kendrick Jacobs on 11/14/18 at 9:59 AM about iron deficiency anemia. She said that she would like to take her iron pills twice daily instead of doing an iron infusion. Discussed that this can cause constipation so sent colace to her pharmacy.    Mira Klein MD PGY1

## 2018-11-15 LAB — LEAD BLDV-MCNC: <2 UG/DL (ref 0–4.9)

## 2018-11-20 ENCOUNTER — RECORDS - HEALTHEAST (OUTPATIENT)
Dept: ADMINISTRATIVE | Facility: OTHER | Age: 33
End: 2018-11-20

## 2018-11-20 LAB
CYTOLOGY CVX/VAG DOC THIN PREP: ABNORMAL
HIGH RISK?: NO
HPV REFLEX?: ABNORMAL
LAB AP ABNORMAL BLEEDING: NO
LAB AP BIRTH CONTROL/HORMONES: ABNORMAL
LAB AP CASE REPORT: ABNORMAL
LAB AP CERVICAL APPEARANCE: ABNORMAL
LAB AP MALIGNANT?: ABNORMAL
LAB AP PATIENT STATUS: ABNORMAL
LAB AP PREVIOUS ABNL: NO
LAB AP PREVIOUS NORMAL: 2013
LAST MENS PERIOD: ABNORMAL
SPECIMEN ADEQUACY:: ABNORMAL

## 2018-12-05 DIAGNOSIS — Z34.92 PREGNANT AND NOT YET DELIVERED IN SECOND TRIMESTER: Primary | ICD-10-CM

## 2018-12-06 NOTE — PROGRESS NOTES
OB ULTRASOUND:  [facility]  DATE:  Monday December 10, 2018  TIME:  11:20 AM  Kimberly Roblreo  12/6/18

## 2018-12-10 DIAGNOSIS — O09.90 SUPERVISION OF HIGH RISK PREGNANCY, ANTEPARTUM: ICD-10-CM

## 2018-12-10 DIAGNOSIS — O09.90 SUPERVISION OF HIGH RISK PREGNANCY, ANTEPARTUM: Primary | ICD-10-CM

## 2018-12-17 ENCOUNTER — OFFICE VISIT (OUTPATIENT)
Dept: FAMILY MEDICINE | Facility: CLINIC | Age: 33
End: 2018-12-17
Payer: COMMERCIAL

## 2018-12-17 VITALS
DIASTOLIC BLOOD PRESSURE: 60 MMHG | RESPIRATION RATE: 16 BRPM | BODY MASS INDEX: 28.26 KG/M2 | HEART RATE: 92 BPM | TEMPERATURE: 97.7 F | OXYGEN SATURATION: 98 % | WEIGHT: 136.4 LBS | SYSTOLIC BLOOD PRESSURE: 95 MMHG

## 2018-12-17 DIAGNOSIS — Z36.89 ENCOUNTER FOR FETAL ANATOMIC SURVEY: ICD-10-CM

## 2018-12-17 DIAGNOSIS — O28.3 ABNORMAL ULTRASONIC FINDING ON ANTENATAL SCREENING OF MOTHER: Primary | ICD-10-CM

## 2018-12-17 PROBLEM — O44.02 PLACENTA PREVIA ANTEPARTUM IN SECOND TRIMESTER: Status: RESOLVED | Noted: 2018-10-29 | Resolved: 2018-12-17

## 2018-12-17 NOTE — PATIENT INSTRUCTIONS
Kendrick was seen today for results.    Diagnoses and all orders for this visit:    Abnormal ultrasonic finding on  screening of mother. We will send the info over and they will call to set up the appointment.  -     PERINATOLOGY REFERRAL; Future    Encounter for fetal anatomic survey    18 Faxed referral to [place]  at Fax # [phone].  They will review and call pt to schedule appointment./[place]

## 2018-12-17 NOTE — PROGRESS NOTES
Chief Complaint   Patient presents with     Results     Results from ultra sound              SUBJECTIVE       Kendrick Jacobs is a 33 year old  female with a PMH significant for:     Patient Active Problem List   Diagnosis     Chronic low back pain     Iron deficiency anemia during pregnancy, antepartum     Supervision of high risk pregnancy, antepartum     Grand multiparity with current pregnancy     Placenta previa antepartum in second trimester     Vaginal bleeding during pregnancy, antepartum     Abnormal pregnancy US     She presents for evaluation of an abnormal ultrasound finding on fetal survey today.  Patient's fetal survey showed a mass of the right hemithorax taking up approximately 50% of the right hemithorax and causing mass-effect with shift of the mediastinum.  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  He noted that this normally grows through 28 weeks and then 70% begin to shrink.  With growth, this can cause a lot of strain on the heart and can lead to congestive heart failure of the fetus.  He also noted that what remained of the mass whether or not it shrinks would be removed following delivery.  Discussed all these findings with patient.     Patient is otherwise feeling well.  She has many questions, however I am not able to answer these and did defer to [place] .  Denies any abdominal cramping or contractions, vaginal bleeding or discharge, nausea or vomiting, fevers or chills.    PMH, Medications and Allergies were reviewed with patient.          OBJECTIVE       Physical Exam:  BP 95/60   Pulse 92   Temp 97.7  F (36.5  C) (Oral)   Resp 16   Wt 61.9 kg (136 lb 6.4 oz)   LMP 2018 (Approximate)   SpO2 98%   BMI 28.26 kg/m    Body mass index is 28.26 kg/m .      General: Adult female sitting on exam table, NAD  Psych:  Anxious    ASSESSMENT AND PLAN     Kendrick was seen today for results.    Diagnoses and all orders for this visit:    Abnormal ultrasonic finding on  screening of mother  Encounter for fetal anatomic survey.  Patient sent up for clinic visit following an abnormal finding on fetal survey today.  Mass of right hemithorax taking at 50% of hemithorax and causing a mass-effect with shift of the mediastinum was found, blood flow was seen going to the mass.  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  We discussed that this is likely to continue to grow through 28 weeks and that 70% shrink after this.  Discussed that it has a risk of causing congestive heart failure with compression of the heart.  Discussed that this will need to be removed following delivery.   For further management will have patient follow-up with [place] [facility] as soon as possible, and suspect that they will take over care and that patient would need to deliver at the mother-baby center next to [facility].     -     PERINATOLOGY REFERRAL; Future    I precepted with Dr. Latoya Rosales MD  PGY-3, [facility]   Pager: [phone]

## 2018-12-17 NOTE — Clinical Note
KAMI. Your patient had an abnormal fetal survey today. Mass taking up 50% of the hemithorax. I am sending her to [place] [facility], I am guessing they will take over care. See note for more details.  -L

## 2018-12-21 NOTE — PROGRESS NOTES
Preceptor Attestation:   Patient seen, evaluated and discussed with the resident. I have verified the content of the note, which accurately reflects my assessment of the patient and the plan of care.   Supervising Physician:  Cosmo Klein MD

## 2018-12-26 ENCOUNTER — TRANSFERRED RECORDS (OUTPATIENT)
Dept: HEALTH INFORMATION MANAGEMENT | Facility: CLINIC | Age: 33
End: 2018-12-26

## 2019-01-07 PROBLEM — O28.3 ABNORMAL PREGNANCY US: Status: ACTIVE | Noted: 2018-12-11

## 2019-01-08 ENCOUNTER — OFFICE VISIT (OUTPATIENT)
Dept: FAMILY MEDICINE | Facility: CLINIC | Age: 34
End: 2019-01-08
Payer: COMMERCIAL

## 2019-01-08 VITALS
HEART RATE: 89 BPM | HEIGHT: 59 IN | BODY MASS INDEX: 28.83 KG/M2 | RESPIRATION RATE: 16 BRPM | DIASTOLIC BLOOD PRESSURE: 61 MMHG | SYSTOLIC BLOOD PRESSURE: 98 MMHG | TEMPERATURE: 97.7 F | WEIGHT: 143 LBS | OXYGEN SATURATION: 97 %

## 2019-01-08 DIAGNOSIS — R30.0 DYSURIA: ICD-10-CM

## 2019-01-08 DIAGNOSIS — D50.9 IRON DEFICIENCY ANEMIA, UNSPECIFIED IRON DEFICIENCY ANEMIA TYPE: ICD-10-CM

## 2019-01-08 DIAGNOSIS — O09.90 HIGH-RISK PREGNANCY, UNSPECIFIED TRIMESTER: ICD-10-CM

## 2019-01-08 DIAGNOSIS — N89.8 VAGINAL DISCHARGE: Primary | ICD-10-CM

## 2019-01-08 DIAGNOSIS — N30.00 ACUTE CYSTITIS WITHOUT HEMATURIA: ICD-10-CM

## 2019-01-08 DIAGNOSIS — O28.3 ABNORMAL PREGNANCY US: ICD-10-CM

## 2019-01-08 LAB
BACTERIA: NORMAL
BACTERIA: NORMAL
BILIRUBIN UR: NEGATIVE
BLOOD UR: NEGATIVE
CASTS: NORMAL /LPF
CLUE CELLS: NORMAL
CRYSTAL URINE: NORMAL /LPF
EPITHELIAL CELLS UR: NORMAL /LPF (ref 0–2)
GLUCOSE URINE: NEGATIVE
HEMOGLOBIN: 10.5 G/DL (ref 11.7–15.7)
KETONES UR QL: NEGATIVE
LEUKOCYTE ESTERASE UR: ABNORMAL
MOTILE TRICHOMONAS: NEGATIVE
MUCOUS URINE: NORMAL LPF
NITRITE UR QL STRIP: NEGATIVE
ODOR: NORMAL
PH UR STRIP: 6 [PH] (ref 5–7)
PH WET PREP: NORMAL
PROTEIN UR: NEGATIVE
RBC URINE: NORMAL /HPF
SP GR UR STRIP: 1.01
UROBILINOGEN UR STRIP-ACNC: ABNORMAL
WBC URINE: <2 /HPF
WBC WET PREP: NORMAL
YEAST: PRESENT

## 2019-01-08 RX ORDER — SULFAMETHOXAZOLE/TRIMETHOPRIM 800-160 MG
1 TABLET ORAL 2 TIMES DAILY
Status: DISCONTINUED | OUTPATIENT
Start: 2019-01-08 | End: 2019-01-08

## 2019-01-08 RX ORDER — SULFAMETHOXAZOLE/TRIMETHOPRIM 800-160 MG
1 TABLET ORAL 2 TIMES DAILY
Status: ACTIVE | OUTPATIENT
Start: 2019-01-08 | End: 2019-01-11

## 2019-01-08 RX ORDER — SULFAMETHOXAZOLE AND TRIMETHOPRIM 400; 80 MG/1; MG/1
1 TABLET ORAL 2 TIMES DAILY
Qty: 6 TABLET | Refills: 0 | Status: SHIPPED | OUTPATIENT
Start: 2019-01-08 | End: 2019-01-11

## 2019-01-08 ASSESSMENT — MIFFLIN-ST. JEOR: SCORE: 1256.33

## 2019-01-08 NOTE — LETTER
February 1, 2019      Kendrick Jacobs  [address]  [place] [address]        Dear Kendrick,      The results from your urine test and culture was normal. No follow up is needed. Your hemoglobin is also improving, so keep taking your iron pills. We will plan to see you at your next OB appointment.    Thank you for allowing me to be a part of your health care!    Please see below for your test results.    Resulted Orders   Urinalysis, Micro If ([facility])   Result Value Ref Range    Specific Gravity Urine 1.015 1.005 - 1.030    pH Urine 6.0 4.5 - 8.0    Leukocyte Esterase UR 1+ (A) NEGATIVE    Nitrite Urine Negative NEGATIVE    Protein UR Negative NEGATIVE    Glucose Urine Negative NEGATIVE    Ketones Urine Negative NEGATIVE    Urobilinogen mg/dL 0.2 E.U./dL 0.2 E.U./dL    Bilirubin UR Negative NEGATIVE    Blood UR Negative NEGATIVE   Wet Prep ([facility])   Result Value Ref Range    Yeast Wet Prep Present none    Motile Trichomonas Wet Prep Negative Negative    Clue Cells Wet Prep None NONE    WBC WET PREP 2-5 2 - 5    Bacteria Wet Prep Few None    pH Wet Prep Not performed 3.8 - 4.5    Odor Wet Prep None NONE   Urine Microscopic ([facility])   Result Value Ref Range    WBC Urine <2 <5 /hpf    RBC Urine None <5 /hpf    Epithelial Cells UR 2-5 0 - 2 /lpf    Mucous Urine None NONE lpf    Casts Urine None NONE /lpf    Crystal Urine None NONE /lpf    Bacteria Wet Prep Few None   Hemoglobin (HGB) ([facility])   Result Value Ref Range    Hemoglobin 10.5 (L) 11.7 - 15.7 g/dL   Urine Culture ([facility])   Result Value Ref Range    Culture SEE RESULTS BELOW       Comment:      CULTURE, URINE   SOURCE: Urine, Voided   CULTURE RESULTS:    No Growth      Narrative    Test performed by:  [facility]  [address] [place] [address]       If you have any questions, please call the clinic to make an appointment.    Sincerely,    Mira Klein MD

## 2019-01-08 NOTE — PATIENT INSTRUCTIONS
Take bactrim twice daily for 3 days  Use vaginal gel at night after done with antibiotics every night for 7 days

## 2019-01-08 NOTE — PROGRESS NOTES
Subjective  Concerns: Kendrick did not have any concerns today.  She said that she feels like this pregnancy is one thing after another.  She said that she continues to feel really bloated like she did in the first trimester.  She also said that she would like some advice on how to not eat so many McGridd[employer].    ROS:  No - Headache  No - Changes in vision  No - Chest Pain  YES - Shortness of Breath  No - Nausea   No - Vomiting  No - Abdominal pain   No - Contractions  YES - Dysuria, burning with urination  YES - Vaginal Discharge, cottage cheese dischage  No - Vaginal bleeding   No - Loss of Fluid   No - Extremity swelling   Present - Fetal movement     Zika Screening  Have you travelled during the pregnancy?No  Have your sexual partner(s) travelled during the pregnancy?No    Going to [facility]? Yes    Risk Assessment   Average Risk Category  No significant risk factors: No    At Risk Category (up to 3)  Teen pregnancy: No  Poor social situation: No  Domestic abuse: No  Financial difficulties: No  Smoker: No  H/O  deliver: No  H/O drug abuse: No  Non-English speaking: No  Advanced maternal age: No  GDM risks: No  Previous C/S: No  H/O PIH: No  H/O STIs: No  H/O mental health concerns: No  Onset care > 20 weeks: No  Other: None    High Risk Category (4 or more At Risk or)  Diabetes/GDM: No  Multiple gestation: No  Chronic hypertension: No  Significant hx of asthma: No  Fetal demise > 20 weeks: No  Positive tox screen: No  Current mental health treatment: No  Other: Fetal scan abnormality, currently being followed by [facility]    Risk: High Risk   Date determined: 2019    Patient Active Problem List   Diagnosis     Chronic low back pain     Iron deficiency anemia during pregnancy, antepartum     Supervision of high risk pregnancy, antepartum     Grand multiparity with current pregnancy     Vaginal bleeding during pregnancy, antepartum     Abnormal pregnancy US       Kendrick Jacobs speaks English so an  " was not used today.    Guidance:  breastfeeding  GDM    Objective  BP 98/61   Pulse 89   Temp 97.7  F (36.5  C) (Oral)   Resp 16   Ht 1.486 m (4' 10.5\")   Wt 64.9 kg (143 lb)   LMP 2018 (Approximate)   SpO2 97%   BMI 29.38 kg/m    No distress.  Gravid abdomen.  .  Fundal height 25 cm.  no edema.    Results   Blood type: A POS  Results for orders placed or performed in visit on 19   Urinalysis, Micro If ([facility])   Result Value Ref Range    Specific Gravity Urine 1.015 1.005 - 1.030    pH Urine 6.0 4.5 - 8.0    Leukocyte Esterase UR 1+ (A) NEGATIVE    Nitrite Urine Negative NEGATIVE    Protein UR Negative NEGATIVE    Glucose Urine Negative NEGATIVE    Ketones Urine Negative NEGATIVE    Urobilinogen mg/dL 0.2 E.U./dL 0.2 E.U./dL    Bilirubin UR Negative NEGATIVE    Blood UR Negative NEGATIVE   Wet Prep ([facility])   Result Value Ref Range    Yeast Wet Prep Present none    Motile Trichomonas Wet Prep Negative Negative    Clue Cells Wet Prep None NONE    WBC WET PREP 2-5 2 - 5    Bacteria Wet Prep Few None    pH Wet Prep Not performed 3.8 - 4.5    Odor Wet Prep None NONE   Urine Microscopic ([facility])   Result Value Ref Range    WBC Urine <2 <5 /hpf    RBC Urine None <5 /hpf    Epithelial Cells UR 2-5 0 - 2 /lpf    Mucous Urine None NONE lpf    Casts Urine None NONE /lpf    Crystal Urine None NONE /lpf    Bacteria Wet Prep Few None   Hemoglobin (HGB) ([facility])   Result Value Ref Range    Hemoglobin 10.5 (L) 11.7 - 15.7 g/dL       Assessment & Plan  33 year old  at 24w2d with FAMILIA 2019 based on LMP and confirmed by 10w1d week US    Kendrick was seen today for prenatal care.    Diagnoses and all orders for this visit:    Vaginal discharge: has a change in her vaginal discharge that looks like \"cottage cheese\" and feels like a yeast infection. Positive yeast cells in wet prep. Instructed to take SMX-TMP for 3 days first before doing the miconazole treatment.  -     Wet Prep ([facility] )  -     Urine Culture ([facility])  -     miconazole (MONISTAT 1 DAY OR NIGHT) 1200 & 2 MG & % kit; Place 1 Application vaginally At Bedtime for 7 days  -     sulfamethoxazole-trimethoprim (BACTRIM/SEPTRA) 400-80 MG tablet; Take 1 tablet by mouth 2 times daily for 3 days    Dysuria/Urinary tract infection: positive leukocyte esterase on UA. Concerning burning when urinating symptom in a pregnant patient. Will empirically treat and follow up on urine culture to confirm bacteria is covered by SMX-TMP.  -     Urinalysis, Micro If ([facility])  -     Urine Microscopic ([facility])    Iron deficiency anemia, unspecified iron deficiency anemia type: prior low hgb, now slightly higher than last trimester. Already takes iron pills.  -     Hemoglobin (HGB) ([facility])    High-risk pregnancy, unspecified trimester: spoke with NP at [facility] on 1/9/19 regarding delivery location. They still need to keep following fetal development to make official recommendation at this point.   -     Glucose Challenge  1 Hr  ([facility]), Future order  -     Follow up with [facility] regarding prenatal care  -     Syphilis testing      Weight gain adequate: 8.165 kg (18 lb) to date, out of recommended total of 15-25 lbs (pregravid BMI 25-29.9)    Patient Instructions   Take bactrim twice daily for 3 days  Use vaginal gel at night after done with antibiotics every night for 7 days      Return to clinic in 4 weeks.    Mira Klein MD  I precepted today with Anat Ferro MD.

## 2019-01-09 LAB — CULTURE: NORMAL

## 2019-01-09 NOTE — PROGRESS NOTES
"Preceptor attestation:  Vital signs reviewed: BP 98/61   Pulse 89   Temp 97.7  F (36.5  C) (Oral)   Resp 16   Ht 1.486 m (4' 10.5\")   Wt 64.9 kg (143 lb)   LMP 07/12/2018 (Approximate)   SpO2 97%   BMI 29.38 kg/m      Patient seen, evaluated, and discussed with the resident.  I have verified the content of the note, which accurately reflects my assessment of the patient and the plan of care.    Supervising physician: Anat Ferro MD  [facility]    "

## 2019-01-14 ENCOUNTER — TRANSFERRED RECORDS (OUTPATIENT)
Dept: HEALTH INFORMATION MANAGEMENT | Facility: CLINIC | Age: 34
End: 2019-01-14

## 2019-01-23 ENCOUNTER — TRANSFERRED RECORDS (OUTPATIENT)
Dept: HEALTH INFORMATION MANAGEMENT | Facility: CLINIC | Age: 34
End: 2019-01-23

## 2019-01-31 NOTE — RESULT ENCOUNTER NOTE
Please send letter to patient with lab results.    Dear Kendrick Jacobs,     The results from your urine test and culture was normal. No follow up is needed. Your hemoglobin is also improving, so keep taking your iron pills. We will plan to see you at your next OB appointment.    Thank you for allowing me to be a part of your health care!    Sincerely,   Dr. Klein  1/31/2019

## 2019-02-06 ENCOUNTER — TRANSFERRED RECORDS (OUTPATIENT)
Dept: HEALTH INFORMATION MANAGEMENT | Facility: CLINIC | Age: 34
End: 2019-02-06

## 2019-02-25 ENCOUNTER — OFFICE VISIT (OUTPATIENT)
Dept: FAMILY MEDICINE | Facility: CLINIC | Age: 34
End: 2019-02-25
Payer: COMMERCIAL

## 2019-02-25 VITALS
DIASTOLIC BLOOD PRESSURE: 63 MMHG | HEIGHT: 58 IN | SYSTOLIC BLOOD PRESSURE: 99 MMHG | TEMPERATURE: 97.4 F | WEIGHT: 155.2 LBS | BODY MASS INDEX: 32.58 KG/M2 | RESPIRATION RATE: 20 BRPM | HEART RATE: 97 BPM

## 2019-02-25 DIAGNOSIS — D64.9 ANEMIA, UNSPECIFIED TYPE: ICD-10-CM

## 2019-02-25 DIAGNOSIS — O09.90 HIGH-RISK PREGNANCY, UNSPECIFIED TRIMESTER: Primary | ICD-10-CM

## 2019-02-25 DIAGNOSIS — O99.713: ICD-10-CM

## 2019-02-25 DIAGNOSIS — L81.1: ICD-10-CM

## 2019-02-25 DIAGNOSIS — O28.3 ABNORMAL PREGNANCY US: ICD-10-CM

## 2019-02-25 LAB
BASOPHILS # BLD AUTO: 0.1 THOU/UL (ref 0–0.2)
BASOPHILS NFR BLD AUTO: 1 % (ref 0–2)
EOSINOPHIL # BLD AUTO: 0.3 THOU/UL (ref 0–0.4)
EOSINOPHIL NFR BLD AUTO: 3 % (ref 0–6)
ERYTHROCYTE [DISTWIDTH] IN BLOOD BY AUTOMATED COUNT: 15.4 % (ref 11–14.5)
GLU GEST SCREEN 1HR 50G: 91 (ref 0–129)
HCT VFR BLD AUTO: 35 % (ref 35–47)
HGB BLD-MCNC: 10.6 G/DL (ref 12–16)
LYMPHOCYTES # BLD AUTO: 1.6 THOU/UL (ref 0.8–4.4)
LYMPHOCYTES NFR BLD AUTO: 20 % (ref 20–40)
MCH RBC QN AUTO: 25.2 PG (ref 27–34)
MCHC RBC AUTO-ENTMCNC: 30.3 G/DL (ref 32–36)
MCV RBC AUTO: 83 FL (ref 80–100)
MONOCYTES # BLD AUTO: 0.6 THOU/UL (ref 0–0.9)
MONOCYTES NFR BLD AUTO: 8 % (ref 2–10)
NEUTROPHILS # BLD AUTO: 5.7 THOU/UL (ref 2–7.7)
NEUTROPHILS NFR BLD AUTO: 69 % (ref 50–70)
PLATELET # BLD AUTO: 200 THOU/UL (ref 140–440)
PMV BLD AUTO: 11.6 FL (ref 8.5–12.5)
RBC # BLD AUTO: 4.21 MILL/UL (ref 3.8–5.4)
WBC # BLD AUTO: 8.3 THOU/UL (ref 4–11)

## 2019-02-25 RX ORDER — FERROUS SULFATE 325(65) MG
325 TABLET ORAL 2 TIMES DAILY
Qty: 30 TABLET | Refills: 3 | Status: SHIPPED | OUTPATIENT
Start: 2019-02-25 | End: 2020-12-29

## 2019-02-25 ASSESSMENT — MIFFLIN-ST. JEOR: SCORE: 1303.73

## 2019-02-25 NOTE — LETTER
February 26, 2019      Kendrick Jacobs  [address]  [place] [address]        Dear Kendrick,    The results from your oral glucose test showed that you do not have gestational diabetes. Your hemoglobin is stable, please keep taking the iron pills. Your syphilis testing was also negative (normal), which is good. We will see you at your next OB visit.    Thank you for allowing me to be a part of your health care!      Please see below for your test results.    Resulted Orders   Syphilis Screen [place] (RPR/VDRL) ([facility])   Result Value Ref Range    Treponema Antibody (Syphilis) Negative Negative    Narrative    Test performed by:  [facility] [address] [place] [address]   Glucose Challenge  1 Hr  ([facility])   Result Value Ref Range    Glu Gest Screen 1hr 50g 91 0 - 129   CBC w/ Diff and Plt ([facility])   Result Value Ref Range    WBC 8.3 4.0 - 11.0 thou/uL    RBC 4.21 3.80 - 5.40 mill/uL    Hemoglobin 10.6 (L) 12.0 - 16.0 g/dL    Hematocrit 35.0 35.0 - 47.0 %    MCV 83 80 - 100 fL    MCH 25.2 (L) 27.0 - 34.0 pg    MCHC 30.3 (L) 32.0 - 36.0 g/dL    RDW 15.4 (H) 11.0 - 14.5 %    Platelets 200 140 - 440 thou/uL    Mean Platelet Volume 11.6 8.5 - 12.5 fL    % Neutrophils 69 50 - 70 %    % Lymphocytes 20 20 - 40 %    % Monocytes 8 2 - 10 %    % Eosinophils 3 0 - 6 %    % Basophils 1 0 - 2 %    Neutrophils (Absolute) 5.7 2.0 - 7.7 thou/uL    Lymphs (Absolute) 1.6 0.8 - 4.4 thou/uL    Monocytes(Absolute) 0.6 0.0 - 0.9 thou/uL    Eos (Absolute) 0.3 0.0 - 0.4 thou/uL    Baso (Absolute) 0.1 0.0 - 0.2 thou/uL    Narrative    Test performed by:  [facility] [address] [place] [address]       If you have any questions, please call the clinic to make an appointment.    Sincerely,    Mira Klein MD

## 2019-02-25 NOTE — PROGRESS NOTES
"Subjective  Concerns: Is very worried about the darkened spots on face, which hasn't happened with prior pregnancies. She thinks she is having Christiana-Doyle and it is increased from before. She is also concerned about her weight gain, which she attributes to drinking more soda. She knows she needs to cut back on the amount of soda she's drinking. 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    ROS:  No - Headache  No - Changes in vision  No - Chest Pain  YES - Shortness of Breath  No - Nausea   No - Vomiting  No - Abdominal pain   YES - Contractions  No - Dysuria   No - Vaginal Discharge    No - Vaginal bleeding   No - Loss of Fluid   No - Extremity swelling   Present - Fetal movement       Going to [facility]? Yes      Patient Active Problem List   Diagnosis     Chronic low back pain     Iron deficiency anemia during pregnancy, antepartum     Supervision of high risk pregnancy, antepartum     Grand multiparity with current pregnancy     Vaginal bleeding during pregnancy, antepartum     Abnormal pregnancy US     Abnormal Pap smear of cervix       Kendrick Jacobs speaks English so an  was not used today.    Guidance:  seatbelt use  fetal growth and movement  signs of  labor    Do you need help getting a car seat? No  Do you need help getting a breast pump? No    Objective  BP 99/63   Pulse 97   Temp 97.4  F (36.3  C)   Resp 20   Ht 1.473 m (4' 10\")   Wt 70.4 kg (155 lb 3.2 oz)   LMP 2018 (Approximate)   BMI 32.44 kg/m    No distress.  Gravid abdomen.  .  Fundal height 32.5 cm.  no edema.    Results  Blood type: A POS  Results for orders placed or performed in visit on 19   Glucose Challenge  1 Hr  ([facility] )   Result Value Ref Range    Glu Gest Screen 1hr 50g 91 0 - 129   CBC w/ Diff and Plt ([facility])   Result Value Ref Range    WBC 8.3 4.0 - 11.0 thou/uL    RBC 4.21 3.80 - 5.40 mill/uL    Hemoglobin 10.6 (L) 12.0 - 16.0 g/dL    Hematocrit 35.0 35.0 - " 47.0 %    MCV 83 80 - 100 fL    MCH 25.2 (L) 27.0 - 34.0 pg    MCHC 30.3 (L) 32.0 - 36.0 g/dL    RDW 15.4 (H) 11.0 - 14.5 %    Platelets 200 140 - 440 thou/uL    Mean Platelet Volume 11.6 8.5 - 12.5 fL    % Neutrophils 69 50 - 70 %    % Lymphocytes 20 20 - 40 %    % Monocytes 8 2 - 10 %    % Eosinophils 3 0 - 6 %    % Basophils 1 0 - 2 %    Neutrophils (Absolute) 5.7 2.0 - 7.7 thou/uL    Lymphs (Absolute) 1.6 0.8 - 4.4 thou/uL    Monocytes(Absolute) 0.6 0.0 - 0.9 thou/uL    Eos (Absolute) 0.3 0.0 - 0.4 thou/uL    Baso (Absolute) 0.1 0.0 - 0.2 thou/uL    Narrative    Test performed by:  [facility] [address] [place] [address]       Assessment & Plan  33 year old  at 31w1d with FAMILIA 2019 based on 10w1d week US    Kendrick was seen today for prenatal care.    Diagnoses and all orders for this visit:    High-risk pregnancy, unspecified trimester: care returned to OB PCP from [facility]. Will go there for weekly  testing after 32 weeks. Plan to deliver at [facility]. Also discussed PAP/HPV results and will refer to OB/Gyn following delivery.  -     Syphilis Screen [place] (RPR/VDRL) ([facility])  -     Glucose Challenge  1 Hr  ([facility])  -     OB/Gyn referral following delivery for high risk HPV testing  -     Weekly  testing at [facility]  -     Serial growth scans 4-6 weeks at [facility]    Melasma gravidarum in third trimester: very troubling to patient. Decided not to pursue any treatment today due to this pregnancy already being complicated and the triple therapy does contain a medicine that when ingested is teratogenic. Understood that is unlikely it was cause any harm to developing fetus, but since this is an aesthetic issue decided to defer for now. Discussed avoiding sun exposure and using sunscreen as much as possible to minimize darkening spots.     Anemia, unspecified type: stable at this point. Reordered ferrous sulfate today. Should check again  closer to delivery as well.  -     ferrous sulfate (FEROSUL) 325 (65 Fe) MG tablet; Take 1 tablet (325 mg) by mouth 2 times daily  -     CBC w/ Diff and Plt ([facility])      Weight gain adequate: 13.7 kg (30 lb 3.2 oz) to date, out of recommended total of 15-25 lbs (pregravid BMI 25-29.9)    There are no Patient Instructions on file for this visit.  Would like Fentanyl for labor.  Would like to do colp here at 4-6 week appointment    Return to clinic in 2 weeks.    Mira Klein MD PGY1  I precepted today with [place] Hart MD.

## 2019-02-26 ENCOUNTER — TRANSFERRED RECORDS (OUTPATIENT)
Dept: HEALTH INFORMATION MANAGEMENT | Facility: CLINIC | Age: 34
End: 2019-02-26

## 2019-02-26 LAB — TREPONEMA ANTIBODY (SYPHILIS): NEGATIVE

## 2019-02-26 NOTE — RESULT ENCOUNTER NOTE
Please send letter to patient with lab results.    Dear Kendrick Jacobs,     The results from your oral glucose test showed that you do not have gestational diabetes. Your hemoglobin is stable, please keep taking the iron pills. Your syphilis testing was also negative (normal), which is good. We will see you at your next OB visit.    Thank you for allowing me to be a part of your health care!    Sincerely,   Dr. Klein  2/26/2019

## 2019-02-28 ENCOUNTER — TELEPHONE (OUTPATIENT)
Dept: FAMILY MEDICINE | Facility: CLINIC | Age: 34
End: 2019-02-28

## 2019-02-28 NOTE — TELEPHONE ENCOUNTER
[facility] phone call message- general phone call:    Reason for call: Nurse is calling to speak to Kasey about her ob care.     Return call needed: Yes    OK to leave a message on voice mail? Yes    Primary language: English      needed? No    Call taken on February 28, 2019 at 2:15 PM by Grisel Flores-Cardona

## 2019-03-01 NOTE — TELEPHONE ENCOUNTER
Ann Marie called to confirm if pt is still coming to [place] for prenatal care and that she will be delivering at [facility]./[place]

## 2019-03-06 ENCOUNTER — TRANSFERRED RECORDS (OUTPATIENT)
Dept: HEALTH INFORMATION MANAGEMENT | Facility: CLINIC | Age: 34
End: 2019-03-06

## 2019-03-07 ENCOUNTER — OFFICE VISIT (OUTPATIENT)
Dept: FAMILY MEDICINE | Facility: CLINIC | Age: 34
End: 2019-03-07
Payer: COMMERCIAL

## 2019-03-07 VITALS
WEIGHT: 150.4 LBS | DIASTOLIC BLOOD PRESSURE: 63 MMHG | RESPIRATION RATE: 20 BRPM | OXYGEN SATURATION: 95 % | TEMPERATURE: 97.7 F | BODY MASS INDEX: 31.43 KG/M2 | SYSTOLIC BLOOD PRESSURE: 98 MMHG | HEART RATE: 78 BPM

## 2019-03-07 DIAGNOSIS — O09.90 SUPERVISION OF HIGH RISK PREGNANCY, ANTEPARTUM: Primary | ICD-10-CM

## 2019-03-07 DIAGNOSIS — Z23 NEED FOR VACCINATION: ICD-10-CM

## 2019-03-07 NOTE — PROGRESS NOTES
Preceptor Attestation:   Patient seen, evaluated and discussed with the resident. I have verified the content of the note, which accurately reflects my assessment of the patient and the plan of care.   Supervising Physician:  [place] Hart MD

## 2019-03-07 NOTE — PROGRESS NOTES
Subjective  Concerns: No concerns. Going to [facility] weekly.    ROS:  No - Headache  No - Changes in vision  No - Chest Pain  No - Shortness of Breath  No - Nausea   No - Vomiting  No - Abdominal pain   YES - Contractions  No - Dysuria   YES - Vaginal Discharge    No - Vaginal bleeding   No - Loss of Fluid   No - Extremity swelling   Present - Fetal movement       Going to [place]? Yes      Patient Active Problem List   Diagnosis     Chronic low back pain     Iron deficiency anemia during pregnancy, antepartum     Supervision of high risk pregnancy, antepartum     Grand multiparity with current pregnancy     Vaginal bleeding during pregnancy, antepartum     Abnormal pregnancy US     Abnormal Pap smear of cervix       Kendrick Jacobs speaks English so an  was not used today.    Guidance:  fetal growth and movement    Do you need help getting a car seat? No  Do you need help getting a breast pump? No    Objective  BP 98/63   Pulse 78   Temp 97.7  F (36.5  C) (Oral)   Resp 20   Wt 68.2 kg (150 lb 6.4 oz)   LMP 07/12/2018 (Approximate)   SpO2 95%   BMI 31.43 kg/m    No distress.  Gravid abdomen.  .  Fundal height 33 cm.  no edema.    Results  Blood type: A POS  Results for orders placed or performed in visit on 02/25/19   Syphilis Screen [place] (RPR/VDRL) ([facility])   Result Value Ref Range    Treponema Antibody (Syphilis) Negative Negative    Narrative    Test performed by:  [facility] [address] [place] [address]   Glucose Challenge  1 Hr  ([facility])   Result Value Ref Range    Glu Gest Screen 1hr 50g 91 0 - 129   CBC w/ Diff and Plt ([facility])   Result Value Ref Range    WBC 8.3 4.0 - 11.0 thou/uL    RBC 4.21 3.80 - 5.40 mill/uL    Hemoglobin 10.6 (L) 12.0 - 16.0 g/dL    Hematocrit 35.0 35.0 - 47.0 %    MCV 83 80 - 100 fL    MCH 25.2 (L) 27.0 - 34.0 pg    MCHC 30.3 (L) 32.0 - 36.0 g/dL    RDW 15.4 (H) 11.0 - 14.5 %    Platelets 200 140 - 440 thou/uL    Mean Platelet  Volume 11.6 8.5 - 12.5 fL    % Neutrophils 69 50 - 70 %    % Lymphocytes 20 20 - 40 %    % Monocytes 8 2 - 10 %    % Eosinophils 3 0 - 6 %    % Basophils 1 0 - 2 %    Neutrophils (Absolute) 5.7 2.0 - 7.7 thou/uL    Lymphs (Absolute) 1.6 0.8 - 4.4 thou/uL    Monocytes(Absolute) 0.6 0.0 - 0.9 thou/uL    Eos (Absolute) 0.3 0.0 - 0.4 thou/uL    Baso (Absolute) 0.1 0.0 - 0.2 thou/uL    Narrative    Test performed by:  [facility]  [address] [place] [address]       Assessment & Plan  33 year old  at 32w4d with FAMILIA 2019 based on 10w week US    Kendrick was seen today for prenatal care.    Diagnoses and all orders for this visit:    Supervision of high risk pregnancy, antepartum: following with [facility] for CPAM growth/supervision ultrasounds, now weekly. Will also do q4 week growth ultrasounds there as well. Has lost some weight since previous visit, attributes it to cutting back on soda.   -     ADMIN VACCINE, INITIAL  -     Check hemoglobin at next vist  -     Possible GBS testing at next visit, though would probably hold off doing until 36 weeks due to most of her other deliveries being at or after 40 weeks GA.    Need for vaccination  -     ADMIN VACCINE, INITIAL  -     TDAP VACCINE ([facility])      Weight gain adequate: 11.5 kg (25 lb 6.4 oz) to date, out of recommended total of 15-25 lbs (pregravid BMI 25-29.9)      Return to clinic in 2 weeks.    Mira Klein MD  I precepted today with [place] Hart MD.

## 2019-03-19 ENCOUNTER — TRANSFERRED RECORDS (OUTPATIENT)
Dept: HEALTH INFORMATION MANAGEMENT | Facility: CLINIC | Age: 34
End: 2019-03-19

## 2019-03-20 ENCOUNTER — OFFICE VISIT (OUTPATIENT)
Dept: FAMILY MEDICINE | Facility: CLINIC | Age: 34
End: 2019-03-20
Payer: COMMERCIAL

## 2019-03-20 VITALS
RESPIRATION RATE: 16 BRPM | HEIGHT: 59 IN | DIASTOLIC BLOOD PRESSURE: 66 MMHG | SYSTOLIC BLOOD PRESSURE: 99 MMHG | TEMPERATURE: 98 F | WEIGHT: 157.4 LBS | BODY MASS INDEX: 31.73 KG/M2 | HEART RATE: 112 BPM

## 2019-03-20 DIAGNOSIS — Z3A.34 34 WEEKS GESTATION OF PREGNANCY: Primary | ICD-10-CM

## 2019-03-20 DIAGNOSIS — O99.713: ICD-10-CM

## 2019-03-20 DIAGNOSIS — O09.43 GRAND MULTIPARITY WITH CURRENT PREGNANCY IN THIRD TRIMESTER: ICD-10-CM

## 2019-03-20 DIAGNOSIS — O28.3 ABNORMAL PREGNANCY US: ICD-10-CM

## 2019-03-20 DIAGNOSIS — L81.1: ICD-10-CM

## 2019-03-20 ASSESSMENT — MIFFLIN-ST. JEOR: SCORE: 1321.65

## 2019-03-20 NOTE — PROGRESS NOTES
"Subjective  Concerns: No concerns. Wanted a note for school due to large number of doctors appointments. Melasma also is bothering her and wanted to see a dermatologist about it.    ROS:  No - Headache  No - Changes in vision  No - Chest Pain  No - Shortness of Breath  No - Nausea   No - Vomiting  No - Abdominal pain   YES - Contractions  No - Dysuria   No - Vaginal Discharge    No - Vaginal bleeding   No - Loss of Fluid   No - Extremity swelling   Present - Fetal movement       Going to [facility]IC? Yes      Patient Active Problem List   Diagnosis     Chronic low back pain     Iron deficiency anemia during pregnancy, antepartum     Supervision of high risk pregnancy, antepartum     Grand multiparity with current pregnancy     Vaginal bleeding during pregnancy, antepartum     Abnormal pregnancy US     Abnormal Pap smear of cervix       Kendrick Jacobs speaks English so an  was not used today.    Guidance:  birth control  travel  warning signs/PIH    Do you need help getting a car seat? No  Do you need help getting a breast pump? No      Objective  BP 99/66   Pulse 112   Temp 98  F (36.7  C)   Resp 16   Ht 1.486 m (4' 10.5\")   Wt 71.4 kg (157 lb 6.4 oz)   LMP 07/12/2018 (Approximate)   BMI 32.34 kg/m    No distress.  Gravid abdomen.  .  Fundal height 34 cm.  no edema.    Results  Blood type: A POS  Results for orders placed or performed in visit on 02/25/19   Syphilis Screen [place] (RPR/VDRL) ([facility])   Result Value Ref Range    Treponema Antibody (Syphilis) Negative Negative    Narrative    Test performed by:  [facility] [address] [place] [address]   Glucose Challenge  1 Hr  ([facility])   Result Value Ref Range    Glu Gest Screen 1hr 50g 91 0 - 129   CBC w/ Diff and Plt ([facility])   Result Value Ref Range    WBC 8.3 4.0 - 11.0 thou/uL    RBC 4.21 3.80 - 5.40 mill/uL    Hemoglobin 10.6 (L) 12.0 - 16.0 g/dL    Hematocrit 35.0 35.0 - 47.0 %    MCV 83 80 - 100 fL    MCH 25.2 (L) " 27.0 - 34.0 pg    MCHC 30.3 (L) 32.0 - 36.0 g/dL    RDW 15.4 (H) 11.0 - 14.5 %    Platelets 200 140 - 440 thou/uL    Mean Platelet Volume 11.6 8.5 - 12.5 fL    % Neutrophils 69 50 - 70 %    % Lymphocytes 20 20 - 40 %    % Monocytes 8 2 - 10 %    % Eosinophils 3 0 - 6 %    % Basophils 1 0 - 2 %    Neutrophils (Absolute) 5.7 2.0 - 7.7 thou/uL    Lymphs (Absolute) 1.6 0.8 - 4.4 thou/uL    Monocytes(Absolute) 0.6 0.0 - 0.9 thou/uL    Eos (Absolute) 0.3 0.0 - 0.4 thou/uL    Baso (Absolute) 0.1 0.0 - 0.2 thou/uL    Narrative    Test performed by:  [facility]  [address] [place] [address]       Assessment & Plan  33 year old  at 34w3d with FAMILIA 2019 based on 10 week US    Kendrick was seen today for prenatal care.    34 weeks gestation of pregnancy  Grand multiparity with current pregnancy in third trimester  Abnormal pregnancy US: currently undergoing weekly BPPs with [facility]. Last BPP 10/10. Next week is growth ultrasound. Plans to deliver at , Fentanyl for pain control. Overall doing well. 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 We did go over options for birth control this visit as well. She was going to think about the various options.    Melasma of pregnancy, third trimester  -     DERMATOLOGY REFERRAL; Future    Weight gain adequate: 14.7 kg (32 lb 6.4 oz) to date, out of recommended total of 15-25 lbs (pregravid BMI 25-29.9).    Patient Instructions   We will call you about setting up appointment with dermatology for your melasma.      Return to clinic in 2 weeks.    Mira Klein MD  I precepted today with Anat Ferro MD.

## 2019-03-20 NOTE — PATIENT INSTRUCTIONS
We will call you about setting up appointment with dermatology for your melasma.    March 21, 2019    Per patient is available for appointment any day in the afternoon.  Larissa MANDUJANO    DERMATOLOGY REFERRAL  [facility]     Phone: [phone]  Fax: [phone]     [facility]  [address] [place] [id number]        Appointment:  Friday April 5, 2019  Arrival Time:  1:30pm  Provider:     Patient is aware of appointment and details no further questions at this time.  Larissa MANDUJANO     Please bring a copy of your insurance card and photo ID    If you cannot make this appointment please call [phone] to reschedule     office notes and demographics faxed to [phone]

## 2019-03-20 NOTE — LETTER
Re:  Kendrick Jacobs  Date:  3/20/2019    To Whom It May Concern:    Kendrick Jacobs has a medical condition that requires weekly to twice weekly medical appointments. She was seen on 3/20/2019 for evaluation of her medical condition.    Please feel free to contact us with any questions or concerns.    Sincerely,        Mira Klein  [address] [address]

## 2019-04-03 ENCOUNTER — TRANSFERRED RECORDS (OUTPATIENT)
Dept: HEALTH INFORMATION MANAGEMENT | Facility: CLINIC | Age: 34
End: 2019-04-03

## 2019-04-15 ENCOUNTER — TELEPHONE (OUTPATIENT)
Dept: FAMILY MEDICINE | Facility: CLINIC | Age: 34
End: 2019-04-15

## 2019-04-15 NOTE — TELEPHONE ENCOUNTER
----- Message from Mira PANCHAL MD sent at 4/15/2019  8:30 AM CDT -----  Regarding: Schedule BEVERLY  Could someone reach out and schedule a BEVERLY with this week? I'm going to be on nights/vacation for a few weeks so it'd be good to see her this week, if possible.    Thanks!

## 2019-04-16 ENCOUNTER — TELEPHONE (OUTPATIENT)
Dept: FAMILY MEDICINE | Facility: CLINIC | Age: 34
End: 2019-04-16

## 2019-04-16 ENCOUNTER — TRANSFERRED RECORDS (OUTPATIENT)
Dept: HEALTH INFORMATION MANAGEMENT | Facility: CLINIC | Age: 34
End: 2019-04-16

## 2019-04-16 NOTE — TELEPHONE ENCOUNTER
[facility] phone call message- general phone call:    Reason for call: [place]  called to let the Dr know the Pt has missed her last 4 appointments and if you have any questions you can call [phone]     Return call needed: No    OK to leave a message on voice mail? Yes    Primary language: English      needed? No    Call taken on 2019 at 11:26 AM by Jean-Paul Aguilar

## 2019-04-16 NOTE — TELEPHONE ENCOUNTER
Pt states that she is at [place]  right now for her [place].  She states that she was late because she did not sleep well due to contractions.  She states that for the last 2 weeks she has been having contractions.  She states that they last for about 20 seconds and happen every 2 hours but they are uncomfortable.  She states that she thinks that she passed her mucus plug last night as she passed a large amount of thick mucus discharge.  Pt has an appt to see Dr Klein on Friday, 19 for BEVERLY.  Told her to call here or [facility] if she has any questions or concerns.  Routed note to Ernie./JOVAN

## 2019-04-19 ENCOUNTER — OFFICE VISIT (OUTPATIENT)
Dept: FAMILY MEDICINE | Facility: CLINIC | Age: 34
End: 2019-04-19
Payer: COMMERCIAL

## 2019-04-19 VITALS
DIASTOLIC BLOOD PRESSURE: 66 MMHG | OXYGEN SATURATION: 96 % | TEMPERATURE: 98 F | SYSTOLIC BLOOD PRESSURE: 99 MMHG | BODY MASS INDEX: 32.99 KG/M2 | RESPIRATION RATE: 20 BRPM | HEART RATE: 97 BPM | WEIGHT: 160.6 LBS

## 2019-04-19 DIAGNOSIS — D50.9 IRON DEFICIENCY ANEMIA DURING PREGNANCY, ANTEPARTUM: Primary | ICD-10-CM

## 2019-04-19 DIAGNOSIS — O09.43 GRAND MULTIPARITY WITH CURRENT PREGNANCY IN THIRD TRIMESTER: ICD-10-CM

## 2019-04-19 DIAGNOSIS — O28.3 ABNORMAL PREGNANCY US: ICD-10-CM

## 2019-04-19 DIAGNOSIS — O99.019 IRON DEFICIENCY ANEMIA DURING PREGNANCY, ANTEPARTUM: Primary | ICD-10-CM

## 2019-04-19 DIAGNOSIS — O09.90 SUPERVISION OF HIGH RISK PREGNANCY, ANTEPARTUM: ICD-10-CM

## 2019-04-19 NOTE — PROGRESS NOTES
Preceptor Attestation:   Patient seen, evaluated and discussed with the resident. I have verified the content of the note, which accurately reflects my assessment of the patient and the plan of care.   Supervising Physician:  Mo Nguyễn MD

## 2019-04-19 NOTE — PATIENT INSTRUCTIONS
Delivery Plan     I DO NOT NEED     Take me to: [facility]  Phone number: [phone]    My name is: Kendrick Jacobs  : 1985    My Doctor is: Aurea Brown   Attending Physician: [facility]  [place] at [facility] [place] 2[place] [phone].    33 year old         -para:   Estimated Date of Delivery: 2019  At 38w5d on 2019  Delivery type: Vaginal Delivery    Patient Active Problem List   Diagnosis     Chronic low back pain     Iron deficiency anemia during pregnancy, antepartum     Supervision of high risk pregnancy, antepartum     Grand multiparity with current pregnancy     Vaginal bleeding during pregnancy, antepartum     Abnormal pregnancy US     Abnormal Pap smear of cervix     Allergies:No Known Allergies    Lab Results:  Blood Type: A POS  GBS: Pending Date completed: 19  Obtain cord gases and send placenta for pathology due to: N/A  Rubella IgG   Date/Time Value Ref Range Status   2018 03:14 PM Positive  Final     HIV Antigen/Antibody   Date/Time Value Ref Range Status   2018 03:14 PM Negative Negative Final     Syphilis Screen Cascade   Date/Time Value Ref Range Status   2013 10:54 AM Non-reactive (Nonreactive) Final     Hepatitis B Surface Antigen   Date/Time Value Ref Range Status   2018 03:14 PM Negative Negative Final     Hemoglobin   Date Value Ref Range Status   2019 10.6 (L) 12.0 - 16.0 g/dL Final   2019 10.5 (L) 11.7 - 15.7 g/dL Final       Last cervical check: 2019 Cervical Dilation: 1, Effacement: 50%    Immunization History   Administered Date(s) Administered     Influenza Vaccine, 3 YRS +, IM (QUADRIVALENT W/PRESERVATIVES) 10/23/2018     TDAP Vaccine (Boostrix) 2019         Immunizations needed postpartum: MMR  Varicella    Who will be present at the delivery?     Do you have a ?No    What are your plans for pain control? Will try  as natural as possible. Nitrous Oxide and IV pain medications. Would like to avoid epidural    Who will cut the umbilical cord?  possibly    Do you plan to breastfeed?No, bottle feed    If your baby is a boy, would you like him circumcised?No    Name of baby's clinic: [place]    Would you like your baby to have the recommended vitamin K shot to prevent bleeding, Hepatitis B shot, and eye ointment to prevent eye infections?Yes    Next Appointment is: 1 week

## 2019-04-19 NOTE — PROGRESS NOTES
Subjective  Concerns: [place] ahumada every 2 hours, very uncomfortable and having difficulty sleeping. Encouraged patient to drink plenty of water and utilize warm showers and baths to ease the contractions.     ROS:  No - Headache  No - Changes in vision  No - Chest Pain  No - Shortness of Breath  No - Nausea   No - Vomiting  No - Abdominal pain   Yes - Contractions  No - Dysuria   No - Vaginal Discharge, maybe passed mucus plug    No - Vaginal bleeding   No - Loss of Fluid   No - Extremity swelling   Present - Fetal movement       Going to WIC? Yes      Patient Active Problem List   Diagnosis     Chronic low back pain     Iron deficiency anemia during pregnancy, antepartum     Supervision of high risk pregnancy, antepartum     Grand multiparity with current pregnancy     Vaginal bleeding during pregnancy, antepartum     Abnormal pregnancy US     Abnormal Pap smear of cervix       Kendrick Jacobs speaks English so an  was not used today.    Guidance:  post-partum care  GBS  signs of labor  pain control during labor    Do you need help getting a car seat? No  Do you need help getting a breast pump? No    Objective  BP 99/66 (BP Location: Right arm, Patient Position: Sitting, Cuff Size: Adult Regular)   Pulse 97   Temp 98  F (36.7  C) (Oral)   Resp 20   Wt 72.8 kg (160 lb 9.6 oz)   LMP 07/12/2018 (Approximate)   SpO2 96%   BMI 32.99 kg/m    No distress.  Gravid abdomen.    Fundal height 39 cm.  no edema.  Cervix: mid position, dilated to 1, effaced 30-50% and soft    Results  Blood type: A POS  Results for orders placed or performed in visit on 02/25/19   Syphilis Screen [place] (RPR/VDRL) ([facility])   Result Value Ref Range    Treponema Antibody (Syphilis) Negative Negative    Narrative    Test performed by:  [facility] [address] [place] [address]   Glucose Challenge  1 Hr  ([facility])   Result Value Ref Range    Glu Gest Screen 1hr 50g 91 0 - 129   CBC w/ Diff and Plt  ([facility])   Result Value Ref Range    WBC 8.3 4.0 - 11.0 thou/uL    RBC 4.21 3.80 - 5.40 mill/uL    Hemoglobin 10.6 (L) 12.0 - 16.0 g/dL    Hematocrit 35.0 35.0 - 47.0 %    MCV 83 80 - 100 fL    MCH 25.2 (L) 27.0 - 34.0 pg    MCHC 30.3 (L) 32.0 - 36.0 g/dL    RDW 15.4 (H) 11.0 - 14.5 %    Platelets 200 140 - 440 thou/uL    Mean Platelet Volume 11.6 8.5 - 12.5 fL    % Neutrophils 69 50 - 70 %    % Lymphocytes 20 20 - 40 %    % Monocytes 8 2 - 10 %    % Eosinophils 3 0 - 6 %    % Basophils 1 0 - 2 %    Neutrophils (Absolute) 5.7 2.0 - 7.7 thou/uL    Lymphs (Absolute) 1.6 0.8 - 4.4 thou/uL    Monocytes(Absolute) 0.6 0.0 - 0.9 thou/uL    Eos (Absolute) 0.3 0.0 - 0.4 thou/uL    Baso (Absolute) 0.1 0.0 - 0.2 thou/uL    Narrative    Test performed by:  [facility] [address] [place] [address]       Assessment & Plan  33 year old  at 38w5d with FAMILIA 2019 based on 10 week US    Kendrick was seen today for prenatal care and medication reconciliation.    Diagnoses and all orders for this visit:    Iron deficiency anemia during pregnancy, antepartum: Patient continues to take her iron with no issues.     Supervision of high risk pregnancy, antepartum: Patient is doing well. No concerns on exam or history today. Will continue routine monitoring in addition to MFM visit. GBS collected today. Advised follow up in 1 week.   -     Clt. Grp B Strp Screen ([facility])    Grand multiparity with current pregnancy in third trimester    Abnormal pregnancy US: Baby has known cystic lesion on lungs. Patient is followed by MFM. Sees them weekly for Biophysical     Weight gain adequate: 16.1 kg (35 lb 9.6 oz) to date, out of recommended total of 15-25 lbs (pregravid BMI 25-29.9)    Patient Instructions     Delivery Plan     I DO NOT NEED     Take me to: [facility]  Phone number: [phone]    My name is: Kendrick Jacobs  : 1985    My Doctor is: Aurea Brown   Attending  Physician: [facility] Faculty  Prenatal care was at [facility] [phone].    33 year old         -para:   Estimated Date of Delivery: 2019  At 38w5d on 2019  Delivery type: Vaginal Delivery    Patient Active Problem List   Diagnosis     Chronic low back pain     Iron deficiency anemia during pregnancy, antepartum     Supervision of high risk pregnancy, antepartum     Grand multiparity with current pregnancy     Vaginal bleeding during pregnancy, antepartum     Abnormal pregnancy US     Abnormal Pap smear of cervix     Allergies:No Known Allergies    Lab Results:  Blood Type: A POS  GBS: Pending Date completed: 19  Obtain cord gases and send placenta for pathology due to: N/A  Rubella IgG   Date/Time Value Ref Range Status   2018 03:14 PM Positive  Final     HIV Antigen/Antibody   Date/Time Value Ref Range Status   2018 03:14 PM Negative Negative Final     Syphilis Screen Cascade   Date/Time Value Ref Range Status   2013 10:54 AM Non-reactive (Nonreactive) Final     Hepatitis B Surface Antigen   Date/Time Value Ref Range Status   2018 03:14 PM Negative Negative Final     Hemoglobin   Date Value Ref Range Status   2019 10.6 (L) 12.0 - 16.0 g/dL Final   2019 10.5 (L) 11.7 - 15.7 g/dL Final       Last cervical check: 2019 Cervical Dilation: 1, Effacement: 50%    Immunization History   Administered Date(s) Administered     Influenza Vaccine, 3 YRS +, IM (QUADRIVALENT W/PRESERVATIVES) 10/23/2018     TDAP Vaccine (Boostrix) 2019         Immunizations needed postpartum: MMR  Varicella    Who will be present at the delivery?     Do you have a ?No    What are your plans for pain control? Will try as natural as possible. Nitrous Oxide and IV pain medications. Would like to avoid epidural    Who will cut the umbilical cord?  possibly    Do you plan to breastfeed?No, bottle feed    If  your baby is a boy, would you like him circumcised?No    Name of baby's clinic: [place]    Would you like your baby to have the recommended vitamin K shot to prevent bleeding, Hepatitis B shot, and eye ointment to prevent eye infections?Yes    Next Appointment is: 1 week       Return to clinic in 1 week.    Aurea Brown MD  I precepted today with Mo Nguyễn MD.

## 2019-04-21 LAB
ALLERGIC TO PENICILLIN: NO
GP B STREP AG SPEC QL LA: NEGATIVE

## 2019-04-24 ENCOUNTER — OFFICE VISIT (OUTPATIENT)
Dept: FAMILY MEDICINE | Facility: CLINIC | Age: 34
End: 2019-04-24
Payer: COMMERCIAL

## 2019-04-24 VITALS
DIASTOLIC BLOOD PRESSURE: 68 MMHG | RESPIRATION RATE: 16 BRPM | TEMPERATURE: 98.1 F | OXYGEN SATURATION: 98 % | WEIGHT: 164.8 LBS | SYSTOLIC BLOOD PRESSURE: 104 MMHG | BODY MASS INDEX: 33.86 KG/M2 | HEART RATE: 95 BPM

## 2019-04-24 DIAGNOSIS — O09.90 SUPERVISION OF HIGH RISK PREGNANCY, ANTEPARTUM: Primary | ICD-10-CM

## 2019-04-24 NOTE — PATIENT INSTRUCTIONS
Delivery Plan       Take me to: [facility]  Phone number: [phone]    My name is: Kendrick Jacobs  : 1985    My Doctor is: Tricia Alonzo   Attending Physician: [facility] Faculty  Prenatal care was at [facility] [phone].    33 year old         -para:   Estimated Date of Delivery: 2019  At 39w3d on 2019  Delivery type: Vaginal Delivery    Patient Active Problem List   Diagnosis     Chronic low back pain     Iron deficiency anemia during pregnancy, antepartum     Supervision of high risk pregnancy, antepartum     Grand multiparity with current pregnancy     Vaginal bleeding during pregnancy, antepartum     Abnormal pregnancy US     Abnormal Pap smear of cervix     Allergies:No Known Allergies    Lab Results:  Blood Type: A POS  GBS:negative Date completed:   Obtain cord gases and send placenta for pathology due to: N/A  Rubella IgG   Date/Time Value Ref Range Status   2018 03:14 PM Positive  Final     HIV Antigen/Antibody   Date/Time Value Ref Range Status   2018 03:14 PM Negative Negative Final     Syphilis Screen Cascade   Date/Time Value Ref Range Status   2013 10:54 AM Non-reactive (Nonreactive) Final     Hepatitis B Surface Antigen   Date/Time Value Ref Range Status   2018 03:14 PM Negative Negative Final     Hemoglobin   Date Value Ref Range Status   2019 10.6 (L) 12.0 - 16.0 g/dL Final   2019 10.5 (L) 11.7 - 15.7 g/dL Final       Last cervical check: 2019 Cervical Dilation: 1, Effacement:  50%    Immunization History   Administered Date(s) Administered     Influenza Vaccine, 3 YRS +, IM (QUADRIVALENT W/PRESERVATIVES) 10/23/2018     TDAP Vaccine (Boostrix) 2019         Immunizations needed postpartum: none    Who will be present at the delivery?     Do you have a ?No    What are your plans for pain control?Natural    Who will cut the umbilical cord?      Do you plan to breastfeed?No    If your baby is a boy, would you like him circumcised?No    Name of baby's clinic: [facility]    Would you like your baby to have the recommended vitamin K shot to prevent bleeding, Hepatitis B shot, and eye ointment to prevent eye infections?Yes        Next Appointment is: 1 week

## 2019-04-24 NOTE — Clinical Note
Hi-- I was staffing this patient with Dr. Alonzo and was trying to find the perinatology recommendations for delivery--I am having trouble finding their notes in the chart.  Can we make sure this is accessible before the patient comes in for delivery with this known pulmonary abnormality? (It might just be my user error, but I tried a couple of different tabs) Thanks so much for your help!

## 2019-04-24 NOTE — PROGRESS NOTES
Subjective  Concerns: None. Has weekly appt with MFM on Friday for NST/BPP.     ROS:  No - Headache  No - Changes in vision  No - Chest Pain  No - Shortness of Breath  No - Nausea   No - Vomiting  No - Abdominal pain   YES - Contractions - [place]  No - Dysuria   No - Vaginal Discharge    No - Vaginal bleeding   No - Loss of Fluid   No - Extremity swelling   Present - Fetal movement       Going to WIC? Yes      Patient Active Problem List   Diagnosis     Chronic low back pain     Iron deficiency anemia during pregnancy, antepartum     Supervision of high risk pregnancy, antepartum     Grand multiparity with current pregnancy     Vaginal bleeding during pregnancy, antepartum     Abnormal pregnancy US     Abnormal Pap smear of cervix       Kendrick Jacobs speaks English so an  was not used today.    Guidance:  post-partum care  GBS  signs of labor  pain control during labor    Do you need help getting a car seat? No  Do you need help getting a breast pump? No    Objective  /68   Pulse 95   Temp 98.1  F (36.7  C) (Oral)   Resp 16   Wt 74.8 kg (164 lb 12.8 oz)   LMP 07/12/2018 (Approximate)   SpO2 98%   BMI 33.86 kg/m    No distress.  Gravid abdomen.  .  Fundal height 39 cm.  no edema.  Cervix: posterior, fingertip dilated and effaced 30-50%    Results  Blood type: A POS  Results for orders placed or performed in visit on 04/19/19   Clt. Grp B Strp Screen ([facility])   Result Value Ref Range    Group B Strep Antigen Negative Negative    Allergic To Penicillin No     Narrative    Test performed by:  [facility] [address] [place] [address]  Intended use:  The [employer] ([employer]) Xpert GBS LB Assay, performed on the [employer]   Systems, is a qualitative in vitro diagnostic test designed to detect Group B   Streptococcus (GBS) DNA from enriched vaginal/rectal swab specimens, using   fully automated realtime polymerase chain reaction (PCR) with fluorogenic   detection of  the amplified DNA. Xpert GBS LB Assay testing is indicated as an   aid in determining GBS colonization status in antepartum women. This assay   does not diagnose or monitor treatment for GBS infections.  The [employer] Xpert   GBS LB Assay is intended for use in hospital, reference or state laboratory   settings.  The device is not intended for point-of-care use.  Methodology:  The [employer] Instrument Systems automate and integrate sample lysis, nucleic   acid purification and amplification, and detection of the target sequence in   simple or complex samples using real-time polymerase chain reaction (PCR). The   GBS primers and probe detect a target within a 3' DNA region adjacent to the   cfb gene of S. agalactiae. A fluorescent signal becomes detected and increases   each time the specific DNA strand is amplified. The Real-time PCR generates a   growth curve with number of cycles on the x-axis and fluorescence on the   y-axis. If the organism s DNA is not detected by the real-time PCR reaction   the growth curve will be flat and will be resulted as negative.       Assessment & Plan  33 year old  at 39w3d with FAMILIA 2019 based on 10 week US.    Kendrick was seen today for prenatal care.    Diagnoses and all orders for this visit:    Supervision of high risk pregnancy, antepartum    Follows with MFM for concern for abnormal lung finding on ultrasound. Okay for delivery at [facility] on chart review. Gets routine NST and BPP weekly that have been reassuring. Only fingertip dilated on exam today. Plan for follow up in clinic in one week.     Weight gain adequate: 18.1 kg (39 lb 12.8 oz) to date, out of recommended total of 25-35 lbs (pregravid BMI 18.5-24.9)    Patient Instructions     Delivery Plan       Take me to: [facility]  Phone number: [phone]    My name is: Kendrick Jacobs  : 1985    My Doctor is: Tricia Alonzo   Attending Physician: [facility]  Prenatal care was  at [facility] [phone].    33 year old         -para:   Estimated Date of Delivery: 2019  At 39w3d on 2019  Delivery type: Vaginal Delivery    Patient Active Problem List   Diagnosis     Chronic low back pain     Iron deficiency anemia during pregnancy, antepartum     Supervision of high risk pregnancy, antepartum     Grand multiparity with current pregnancy     Vaginal bleeding during pregnancy, antepartum     Abnormal pregnancy US     Abnormal Pap smear of cervix     Allergies:No Known Allergies    Lab Results:  Blood Type: A POS  GBS:negative Date completed:   Obtain cord gases and send placenta for pathology due to: N/A  Rubella IgG   Date/Time Value Ref Range Status   2018 03:14 PM Positive  Final     HIV Antigen/Antibody   Date/Time Value Ref Range Status   2018 03:14 PM Negative Negative Final     Syphilis Screen Cascade   Date/Time Value Ref Range Status   2013 10:54 AM Non-reactive (Nonreactive) Final     Hepatitis B Surface Antigen   Date/Time Value Ref Range Status   2018 03:14 PM Negative Negative Final     Hemoglobin   Date Value Ref Range Status   2019 10.6 (L) 12.0 - 16.0 g/dL Final   2019 10.5 (L) 11.7 - 15.7 g/dL Final       Last cervical check: 2019 Cervical Dilation: 1, Effacement:  50%    Immunization History   Administered Date(s) Administered     Influenza Vaccine, 3 YRS +, IM (QUADRIVALENT W/PRESERVATIVES) 10/23/2018     TDAP Vaccine (Boostrix) 2019         Immunizations needed postpartum: none    Who will be present at the delivery?     Do you have a ?No    What are your plans for pain control?Natural    Who will cut the umbilical cord?     Do you plan to breastfeed?No    If your baby is a boy, would you like him circumcised?No    Name of baby's clinic: [facility]    Would you like your baby to have the recommended vitamin K shot to prevent  bleeding, Hepatitis B shot, and eye ointment to prevent eye infections?Yes        Next Appointment is: 1 week        Return to clinic in 1 week.    Tricia Alonzo, DO PGY-3  I precepted today with Anne Jeffrey MD.

## 2019-04-25 ENCOUNTER — TELEPHONE (OUTPATIENT)
Dept: FAMILY MEDICINE | Facility: CLINIC | Age: 34
End: 2019-04-25

## 2019-04-25 DIAGNOSIS — O28.3 ABNORMAL PREGNANCY US: ICD-10-CM

## 2019-04-25 NOTE — TELEPHONE ENCOUNTER
Called [facility] and spoke with care coordinator.  She states that she will fax the plan from Mo Cali, the pediatric surgeon at [facility] ([phone]) which states that it is fine to deliver at [facility].  Baby should have a chest CTA at 2-3 months of life and then follow-up with the pediatric surgeon if needed at 6 mos of age to remove mass.  She also faxed recommendations to have NNP at delivery to consult for appropriate admission placement (NICU, SCN, or room in on [facility]).  If rooming with mom recommend pulse oximeter spot check every 3-4 hours x's 3 and call NICU if sats <92% or RR >60.   Routed note to Dr Klein, Dr Alonzo, and Dr Jeffrey./JOVAN

## 2019-04-29 ENCOUNTER — OFFICE VISIT (OUTPATIENT)
Dept: FAMILY MEDICINE | Facility: CLINIC | Age: 34
End: 2019-04-29
Payer: COMMERCIAL

## 2019-04-29 ENCOUNTER — TELEPHONE (OUTPATIENT)
Dept: FAMILY MEDICINE | Facility: CLINIC | Age: 34
End: 2019-04-29

## 2019-04-29 DIAGNOSIS — D50.9 IRON DEFICIENCY ANEMIA DURING PREGNANCY, ANTEPARTUM: ICD-10-CM

## 2019-04-29 DIAGNOSIS — O09.90 SUPERVISION OF HIGH RISK PREGNANCY, ANTEPARTUM: Primary | ICD-10-CM

## 2019-04-29 DIAGNOSIS — O99.019 IRON DEFICIENCY ANEMIA DURING PREGNANCY, ANTEPARTUM: ICD-10-CM

## 2019-04-29 ASSESSMENT — MIFFLIN-ST. JEOR: SCORE: 1342.52

## 2019-04-29 NOTE — PROGRESS NOTES
Preceptor Attestation:   Patient seen, evaluated and discussed with the resident. I have verified the content of the note, which accurately reflects my assessment of the patient and the plan of care.   Supervising Physician:  Neil Arroyo MD

## 2019-04-29 NOTE — TELEPHONE ENCOUNTER
[facility] phone call message- general phone call:    Reason for call: She would like to talk Kasey.    Return call needed: Yes    OK to leave a message on voice mail? Yes    Primary language: English      needed? No    Call taken on April 29, 2019 at 11:33 AM by Kristen Ordaz

## 2019-04-29 NOTE — TELEPHONE ENCOUNTER
"Pt states that she is now over due as yesterday was her due date.  She states that she is miserable with irregular contractions (had 3 yesterday 15 min apart but then they stopped), back pain, feeling \"a lot more pressure\", and increased mucus discharge.  Told her that she should schedule a BEVERLY for this week and appt made for today at 3:30 PM with Dr Brown.  Asked pt when her next BPP/NST is with [facility]PP and she states that she has not scheduled it yet as she was hoping that she would have delivered already.  Told her that she should call them to schedule one as soon as possible and she should continue to have them weekly until she delivers.  Pt verbalized understanding.  Routed note to Dr Brown and Dr Klein./JOVAN  "

## 2019-04-29 NOTE — PROGRESS NOTES
"Subjective  Concerns: having painful contraction    ROS:  No - Headache  No - Changes in vision  No - Chest Pain  No - Shortness of Breath  No - Nausea   No - Vomiting  No - Abdominal pain   YES - Contractions  No - Dysuria   No - Vaginal Discharge    No - Vaginal bleeding   No - Loss of Fluid   YES - Extremity swelling, gradual  Present - Fetal movement       Going to WIC? Yes      Patient Active Problem List   Diagnosis     Chronic low back pain     Iron deficiency anemia during pregnancy, antepartum     Supervision of high risk pregnancy, antepartum     Grand multiparity with current pregnancy     Vaginal bleeding during pregnancy, antepartum     Abnormal pregnancy US     Abnormal Pap smear of cervix       Kendrick Jacobs speaks English so an  was not used today.    Guidance:  post-partum care  signs of labor  pain control during labor    Do you need help getting a car seat? No  Do you need help getting a breast pump? No      Objective  /69 (BP Location: Left arm, Patient Position: Sitting, Cuff Size: Adult Regular)   Pulse 91   Temp 97.9  F (36.6  C) (Oral)   Resp 22   Ht 1.486 m (4' 10.5\")   Wt 73.5 kg (162 lb)   LMP 07/12/2018 (Approximate)   SpO2 96%   BMI 33.28 kg/m    No distress.  Gravid abdomen.  .  Fundal height 38 cm.  trace edema.  Cervix: anterior and dilated to 1, 50% soft, -2.     Results  Blood type: A POS  Results for orders placed or performed in visit on 04/19/19   Clt. Grp B Strp Screen ([employer])   Result Value Ref Range    Group B Strep Antigen Negative Negative    Allergic To Penicillin No     Narrative    Test performed by:  [facility] [address] [place] [address]  Intended use:  The [employer] Xpert GBS LB Assay, performed on the [employer]   Systems, is a qualitative in vitro diagnostic test designed to detect Group B   Streptococcus (GBS) DNA from enriched vaginal/rectal swab specimens, using   fully automated realtime polymerase " chain reaction (PCR) with fluorogenic   detection of the amplified DNA. Xpert GBS LB Assay testing is indicated as an   aid in determining GBS colonization status in antepartum women. This assay   does not diagnose or monitor treatment for GBS infections.  The [employer] Xpert   GBS LB Assay is intended for use in hospital, reference or state laboratory   settings.  The device is not intended for point-of-care use.  Methodology:  The [employer] Instrument Systems automate and integrate sample lysis, nucleic   acid purification and amplification, and detection of the target sequence in   simple or complex samples using real-time polymerase chain reaction (PCR). The   GBS primers and probe detect a target within a 3' DNA region adjacent to the   cfb gene of S. agalactiae. A fluorescent signal becomes detected and increases   each time the specific DNA strand is amplified. The Real-time PCR generates a   growth curve with number of cycles on the x-axis and fluorescence on the   y-axis. If the organism s DNA is not detected by the real-time PCR reaction   the growth curve will be flat and will be resulted as negative.       Assessment & Plan  33 year old  at 40w1d with FAMILIA 2019 based on 10 week US    Kendrick was seen today for prenatal care.    Diagnoses and all orders for this visit:    Supervision of high risk pregnancy, antepartum: Patient gets routine NST and BPP's and is not currently scheduled. She has been trying to contact  all day. Will continue tonight and have RN call tomorrow. Patient is post dates. Scheduled induction for 41W 0D at [facility]. Based on exam will do low dose pitocin. Performed membrane stripping today.     Iron deficiency anemia during pregnancy, antepartum: continues to take iron daily      Date of induction: 19  FAMILIA: Estimated Date of Delivery: 2019   GA: 40w1d weeks   G/P:   Level of induction: Level 4    Fetal maturity criteria: for level 4 and 5    U/S  at 6-11 weeks that supports GA greater than 39 weeks.    Estimated fetal weight: <4500gms      Knott score:   Sign Assessment 0 1 2 3 Score   Dilation 0 1-2 3-4 5-6 1   Effacement 0-30% 40-50% 60-70% 80% 1   Consistency Firm Average Soft ------- 2   Position Posterior Mid Anterior ------- 2   Station -3 -2 -1/0 +1 1       Total 7     *If knott score <6, consider cervical ripening    Form of induction: low dose pitocin.      Weight gain adequate: 16.8 kg (37 lb) to date, out of recommended total of 15-25 lbs (pregravid BMI 25-29.9)    Patient Instructions     Delivery Plan         Take me to: [facility]  Phone number: [phone]     My name is: Kendrick Jacobs  : 1985     My Doctor is: Mira Klein   Attending Physician: [facility] Faculty  Prenatal care was at [facility] [phone].     33 year old                                                                                -para:   Estimated Date of Delivery: 2019  At 39w3d on 2019  Delivery type: Vaginal Delivery         Patient Active Problem List   Diagnosis     Chronic low back pain     Iron deficiency anemia during pregnancy, antepartum     Supervision of high risk pregnancy, antepartum     Grand multiparity with current pregnancy     Vaginal bleeding during pregnancy, antepartum     Abnormal pregnancy US     Abnormal Pap smear of cervix      Allergies:No Known Allergies     Lab Results:  Blood Type: A POS  GBS:negative Date completed:   Obtain cord gases and send placenta for pathology due to: N/A        Rubella IgG   Date/Time Value Ref Range Status   2018 03:14 PM Positive   Final            HIV Antigen/Antibody   Date/Time Value Ref Range Status   2018 03:14 PM Negative Negative Final            Syphilis Screen Cascade   Date/Time Value Ref Range Status   2013 10:54 AM Non-reactive (Nonreactive) Final            Hepatitis B Surface Antigen    Date/Time Value Ref Range Status   2018 03:14 PM Negative Negative Final            Hemoglobin   Date Value Ref Range Status   2019 10.6 (L) 12.0 - 16.0 g/dL Final   2019 10.5 (L) 11.7 - 15.7 g/dL Final         Last cervical check: 2019 Cervical Dilation: 1, Effacement:  50%          Immunization History   Administered Date(s) Administered     Influenza Vaccine, 3 YRS +, IM (QUADRIVALENT W/PRESERVATIVES) 10/23/2018     TDAP Vaccine (Boostrix) 2019            Immunizations needed postpartum: none     Who will be present at the delivery?      Do you have a ?No     What are your plans for pain control?Natural     Who will cut the umbilical cord?      Do you plan to breastfeed?No     If your baby is a boy, would you like him circumcised?No     Name of baby's clinic: [facility]     Would you like your baby to have the recommended vitamin K shot to prevent bleeding, Hepatitis B shot, and eye ointment to prevent eye infections?Yes           Date of induction: 19  Time: 7:30  FAMILIA: Estimated Date of Delivery: 2019   GA: 40w1d weeks   G/P:     Call [facility] at  ([phone]) to ensure that they are able to start your induction on time.    Go to [facility].    [facility]    As we discussed, induction is a method of treatment to try and get you to go into labor.  There are 2 different forms of induction that we use.  The first is called cervidil or cytotec in which we will give you by mouth or place by your cervix to get it ready for labor.  Sometimes this will also start labor.  The second method is pitocin, which is a medicine that you get through an IV that causes your body to have contractions.      When you go to the hospital you will be receiving: Pitocin        Aurea Brown MD  I precepted today with Neil Arroyo MD.

## 2019-04-29 NOTE — PATIENT INSTRUCTIONS
Delivery Plan         Take me to: [facility]  Phone number: [phone]     My name is: Kendrick Jacobs  : 1985     My Doctor is: Mira Klein   Attending Physician: [facility] Faculty  Prenatal care was at [facility] [phone].     33 year old                                                                                -para:   Estimated Date of Delivery: 2019  At 39w3d on 2019  Delivery type: Vaginal Delivery         Patient Active Problem List   Diagnosis     Chronic low back pain     Iron deficiency anemia during pregnancy, antepartum     Supervision of high risk pregnancy, antepartum     Grand multiparity with current pregnancy     Vaginal bleeding during pregnancy, antepartum     Abnormal pregnancy US     Abnormal Pap smear of cervix      Allergies:No Known Allergies     Lab Results:  Blood Type: A POS  GBS:negative Date completed:   Obtain cord gases and send placenta for pathology due to: N/A        Rubella IgG   Date/Time Value Ref Range Status   2018 03:14 PM Positive   Final            HIV Antigen/Antibody   Date/Time Value Ref Range Status   2018 03:14 PM Negative Negative Final            Syphilis Screen Cascade   Date/Time Value Ref Range Status   2013 10:54 AM Non-reactive (Nonreactive) Final            Hepatitis B Surface Antigen   Date/Time Value Ref Range Status   2018 03:14 PM Negative Negative Final            Hemoglobin   Date Value Ref Range Status   2019 10.6 (L) 12.0 - 16.0 g/dL Final   2019 10.5 (L) 11.7 - 15.7 g/dL Final         Last cervical check: 2019 Cervical Dilation: 1, Effacement:  50%          Immunization History   Administered Date(s) Administered     Influenza Vaccine, 3 YRS +, IM (QUADRIVALENT W/PRESERVATIVES) 10/23/2018     TDAP Vaccine (Boostrix) 2019            Immunizations needed postpartum: none     Who will be present at the  delivery?      Do you have a ?No     What are your plans for pain control?Natural     Who will cut the umbilical cord?      Do you plan to breastfeed?No     If your baby is a boy, would you like him circumcised?No     Name of baby's clinic: [facility]     Would you like your baby to have the recommended vitamin K shot to prevent bleeding, Hepatitis B shot, and eye ointment to prevent eye infections?Yes           Date of induction: 19  Time: 7:30  FAMILIA: Estimated Date of Delivery: 2019   GA: 40w1d weeks   G/P:     Call [facility] at  ([phone]) to ensure that they are able to start your induction on time.    Go to [facility].     [facility]    As we discussed, induction is a method of treatment to try and get you to go into labor.  There are 2 different forms of induction that we use.  The first is called cervidil or cytotec in which we will give you by mouth or place by your cervix to get it ready for labor.  Sometimes this will also start labor.  The second method is pitocin, which is a medicine that you get through an IV that causes your body to have contractions.      When you go to the hospital you will be receiving: Pitocin    May 22, 2019    OB/GYN REFERRAL  [facility]  Phone: [phone]  Fax: [phone]    [facility]  [address], [address]  [place] [address]    [facility]  [address], [address]  [place] [address]    [facility]  [facility], [address]  [place] [address]    Please bring a copy of your insurance card and photo ID    If you cannot make this appointment please call [phone] to reschedule     Referral, demographics, labs and office note faxed to [phone], who will contact patient to schedule appointment. Larissa MANDUJANO

## 2019-04-30 ENCOUNTER — TELEPHONE (OUTPATIENT)
Dept: FAMILY MEDICINE | Facility: CLINIC | Age: 34
End: 2019-04-30

## 2019-04-30 ENCOUNTER — HOSPITAL ENCOUNTER (OUTPATIENT)
Dept: MEDSURG UNIT | Facility: CLINIC | Age: 34
Discharge: HOME | End: 2019-05-01
Attending: FAMILY MEDICINE | Admitting: FAMILY MEDICINE

## 2019-04-30 ENCOUNTER — COMMUNICATION - HEALTHEAST (OUTPATIENT)
Dept: SCHEDULING | Facility: CLINIC | Age: 34
End: 2019-04-30

## 2019-04-30 DIAGNOSIS — N89.8 VAGINAL DISCHARGE: ICD-10-CM

## 2019-04-30 DIAGNOSIS — N30.00 ACUTE CYSTITIS WITHOUT HEMATURIA: ICD-10-CM

## 2019-04-30 DIAGNOSIS — B96.89 BACTERIAL VAGINOSIS: ICD-10-CM

## 2019-04-30 DIAGNOSIS — N76.0 BACTERIAL VAGINOSIS: ICD-10-CM

## 2019-04-30 NOTE — TELEPHONE ENCOUNTER
LMTCC for pt to let her know that [place]  [place], 19 at 10:30 AM at [place] location where she has been going./[facility]  [address]  [place] [address]

## 2019-05-01 ENCOUNTER — TELEPHONE (OUTPATIENT)
Dept: FAMILY MEDICINE | Facility: CLINIC | Age: 34
End: 2019-05-01

## 2019-05-01 LAB
ALBUMIN UR-MCNC: ABNORMAL MG/DL
APPEARANCE UR: CLEAR
BACTERIA #/AREA URNS HPF: ABNORMAL HPF
BILIRUB UR QL STRIP: NEGATIVE
CLUE CELLS: ABNORMAL
COLOR UR AUTO: YELLOW
GLUCOSE UR STRIP-MCNC: NEGATIVE MG/DL
HGB UR QL STRIP: NEGATIVE
KETONES UR STRIP-MCNC: NEGATIVE MG/DL
LEUKOCYTE ESTERASE UR QL STRIP: ABNORMAL
MUCOUS THREADS #/AREA URNS LPF: ABNORMAL LPF
NITRATE UR QL: NEGATIVE
PH UR STRIP: 6 [PH] (ref 4.5–8)
RBC #/AREA URNS AUTO: ABNORMAL HPF
RUPTURE OF FETAL MEMBRANES BY ROM PLUS: NEGATIVE
SP GR UR STRIP: 1.02 (ref 1–1.03)
SQUAMOUS #/AREA URNS AUTO: >100 LPF
TRICHOMONAS, WET PREP: ABNORMAL
UROBILINOGEN UR STRIP-ACNC: ABNORMAL
WBC #/AREA URNS AUTO: ABNORMAL HPF
YEAST, WET PREP: ABNORMAL

## 2019-05-01 ASSESSMENT — MIFFLIN-ST. JEOR: SCORE: 1326.39

## 2019-05-01 NOTE — TELEPHONE ENCOUNTER
[facility] phone call message- general phone call:    Reason for call:    Pt was last seen on 04/16 and has had 6 no shows for her bio physical ultrasounds. Pt reschedules, but does not show to her appts.     Return call needed: Yes    OK to leave a message on voice mail? Yes    Primary language: English      needed? No    Call taken on May 1, 2019 at 11:32 AM by Karla Gonzalez

## 2019-05-02 ENCOUNTER — COMMUNICATION - HEALTHEAST (OUTPATIENT)
Dept: SCHEDULING | Facility: CLINIC | Age: 34
End: 2019-05-02

## 2019-05-02 ENCOUNTER — TRANSFERRED RECORDS (OUTPATIENT)
Dept: HEALTH INFORMATION MANAGEMENT | Facility: CLINIC | Age: 34
End: 2019-05-02

## 2019-05-02 ENCOUNTER — ANESTHESIA - HEALTHEAST (OUTPATIENT)
Dept: OBGYN | Facility: CLINIC | Age: 34
End: 2019-05-02

## 2019-05-02 LAB — BACTERIA SPEC CULT: NO GROWTH

## 2019-05-03 VITALS
RESPIRATION RATE: 22 BRPM | BODY MASS INDEX: 32.66 KG/M2 | DIASTOLIC BLOOD PRESSURE: 69 MMHG | OXYGEN SATURATION: 96 % | HEIGHT: 59 IN | SYSTOLIC BLOOD PRESSURE: 109 MMHG | HEART RATE: 91 BPM | TEMPERATURE: 97.9 F | WEIGHT: 162 LBS

## 2019-05-03 PROBLEM — O09.40 GRAND MULTIPARITY WITH CURRENT PREGNANCY: Status: RESOLVED | Noted: 2018-11-12 | Resolved: 2019-05-03

## 2019-05-03 PROBLEM — O09.90 SUPERVISION OF HIGH RISK PREGNANCY, ANTEPARTUM: Status: RESOLVED | Noted: 2018-11-12 | Resolved: 2019-05-03

## 2019-05-03 PROBLEM — O99.019 IRON DEFICIENCY ANEMIA DURING PREGNANCY, ANTEPARTUM: Status: RESOLVED | Noted: 2018-11-12 | Resolved: 2019-05-03

## 2019-05-03 PROBLEM — O46.90 VAGINAL BLEEDING DURING PREGNANCY, ANTEPARTUM: Status: RESOLVED | Noted: 2018-10-24 | Resolved: 2019-05-03

## 2019-05-03 PROBLEM — D50.9 IRON DEFICIENCY ANEMIA DURING PREGNANCY, ANTEPARTUM: Status: RESOLVED | Noted: 2018-11-12 | Resolved: 2019-05-03

## 2019-05-06 ENCOUNTER — TELEPHONE (OUTPATIENT)
Dept: FAMILY MEDICINE | Facility: CLINIC | Age: 34
End: 2019-05-06

## 2019-05-06 NOTE — TELEPHONE ENCOUNTER
's name is Angel GOLD 20.  Mother concerned that baby is yellow.      See his records for further documentation.    Routed to Dr. Klein/ANA Rivera RN

## 2019-05-06 NOTE — TELEPHONE ENCOUNTER
[facility] phone call message-patient reporting a symptom:     Symptom:     Pt's mother is stating that the baby is looking yellow-augustin and is concern.     Same Day Visit Offered: Yes     Additional comments: None    OK to leave message on voice mail? Yes    Primary language: English      needed? No    Call taken on May 6, 2019 at 4:28 PM by Karla Gonzalez

## 2019-05-21 DIAGNOSIS — R87.619 ABNORMAL CERVICAL PAPANICOLAOU SMEAR, UNSPECIFIED ABNORMAL PAP FINDING: Primary | ICD-10-CM

## 2019-09-16 ENCOUNTER — TELEPHONE (OUTPATIENT)
Dept: FAMILY MEDICINE | Facility: CLINIC | Age: 34
End: 2019-09-16

## 2019-09-16 NOTE — TELEPHONE ENCOUNTER
[facility] phone call message- general phone call:    Reason for call: she would like a call back     Action desired: .    Return call needed: Yes    OK to leave a message on voice mail? Yes    Advised patient to response may take up to 2 business days: Yes    Primary language: English      needed? No    Call taken on September 16, 2019 at 10:22 AM by Kristen Ordaz

## 2019-12-12 ENCOUNTER — OFFICE VISIT (OUTPATIENT)
Dept: FAMILY MEDICINE | Facility: CLINIC | Age: 34
End: 2019-12-12
Payer: COMMERCIAL

## 2019-12-12 VITALS
SYSTOLIC BLOOD PRESSURE: 104 MMHG | TEMPERATURE: 97.9 F | DIASTOLIC BLOOD PRESSURE: 67 MMHG | OXYGEN SATURATION: 98 % | WEIGHT: 133 LBS | HEART RATE: 79 BPM | BODY MASS INDEX: 27.32 KG/M2 | RESPIRATION RATE: 16 BRPM

## 2019-12-12 DIAGNOSIS — R20.2 PARESTHESIA: Primary | ICD-10-CM

## 2019-12-12 NOTE — PATIENT INSTRUCTIONS
"LEFT big toe issue. \"numb, tingle\" on bottom.  Some low back issues lately, but shooting to leg.  Injury putting up decorations.    Suspect \"nerve injury\"  Back issue from \"disc pushing on nerve\" less likely. Monitor for weakness lifting Big toe.    1) Multi vitamin with \"B\"    REcheck if not resolved in 6 weeks  "

## 2019-12-15 NOTE — PROGRESS NOTES
"       SUBJECTIVE       Kendrick Jacobs is a 34 year old  female with a PMH significant for:     Patient Active Problem List   Diagnosis     Chronic low back pain     Abnormal pregnancy US     Abnormal Pap smear of cervix     She presents with LEFT big toe issue. \"numb, tingle\" on bottom.  Some low back issues lately, but shooting to leg.  Present for 6 weeks. Around then:  Injury putting up decorations. Direct trauma.    PMH, Medications and Allergies were reviewed and updated as needed.        REVIEW OF SYSTEMS     General: No fevers, chills, sweats, unexplained weight loss  Head: No headache  Neck: No swallowing problems   CV: No chest pain or palpitations  Resp: No shortness of breath.  No cough. No hemoptysis.  GI: No constipation, diarrhea, or blood in stool.  no nausea or vomiting  : No pain passing urine or urinary frequency            OBJECTIVE     Vitals:    12/12/19 1629   BP: 104/67   Pulse: 79   Resp: 16   Temp: 97.9  F (36.6  C)   TempSrc: Oral   SpO2: 98%   Weight: 60.3 kg (133 lb)     Body mass index is 27.32 kg/m .    Gen:  Well nourished and in NAD  HEENT: PERRLA; TMs normal color and landmarks; nasopharynx pink and moist; oropharynx pink and moist  Neck: supple without lymphadenopathy  CV:  RRR  - no murmurs, rubs, or gallups,   Pulm:  CTAB, no wheezes/rales/rhonchi, good air entry   ABD: soft, nontender, no masses, no rebound, BS intact throughout  Extrem: RIGHT great toe skin intact. Plantar and dorsal flexion normal. Good DP pulse. 2+ achilles reflex  Psych: Euthymic     No results found for this or any previous visit (from the past 24 hour(s)).        ASSESSMENT AND PLAN     Kendrick was seen today for numbness.    Diagnoses and all orders for this visit:    Paresthesia        Patient Instructions   LEFT big toe issue. \"numb, tingle\" on bottom.  Some low back issues lately, but shooting to leg.  Injury putting up decorations.    Suspect \"nerve injury\"  Back issue from \"disc pushing on nerve\" less " "likely. Monitor for weakness lifting Big toe.    1) Multi vitamin with \"B\"    REcheck if not resolved in 6 weeks      Total of 15 minutes was spent in face to face contact with patient with > 50% in counseling and coordination of care.  Options for treatment and/or follow-up care were reviewed with the patient. Kendrick Jacobs was engaged and actively involved in the decision making process. She verbalized understanding of the options discussed and was satisfied with the final plan.    Mariano Gilmore MD         "

## 2019-12-27 ENCOUNTER — TELEPHONE (OUTPATIENT)
Dept: FAMILY MEDICINE | Facility: CLINIC | Age: 34
End: 2019-12-27

## 2019-12-27 NOTE — TELEPHONE ENCOUNTER
[facility] phone call message-patient reporting a symptom:     Symptom: pt is having chest discomfort and is wondering if she should be seen.  She would like to speak with Kasey.    Same Day Visit Offered: no    Additional comments: none    OK to leave message on voice mail? Yes    Primary language: English      needed? No    Call taken on December 27, 2019 at 9:46 AM by Evan Lawson

## 2019-12-27 NOTE — TELEPHONE ENCOUNTER
Pt states that since last night she has had constant tightness in her chest between her breasts that goes through to the back.  She states that her throat also feels tight and it hurts to breathe.  She also is now having subjective fever and chills.  Her baby was just diagnosed with influenza B.  Pt denies coughing currently.  Told her that she needs to be evaluated and she states that she will have someone take her to [facility] ER for eval now.  Told her to let the doctor know that her son has influenza B.  Pt verbalized understanding./NG

## 2020-03-03 NOTE — MR AVS SNAPSHOT
After Visit Summary   11/12/2018    Kendrick Jacobs    MRN: [id number]           Patient Information     Date Of Birth          1985        Visit Information        Provider Department      11/12/2018 1:00 PM [facility]        Today's Diagnoses     Supervision of high risk pregnancy, antepartum        Grand multiparity with current pregnancy        Placenta previa antepartum in second trimester        Iron deficiency anemia during pregnancy, antepartum        Vaginal bleeding during pregnancy, antepartum           Follow-ups after your visit        Your next 10 appointments already scheduled     Dec 10, 2018 11:20 AM CST   ULTRASOUND with Destini Plunkett   [facility] ([facility])    [address] [place] [address]   [phone]            Dec 13, 2018  1:30 PM CST   RETURN OB with Mira PANCHAL MD   [facility] ([facility])    [address] [place] [address]   [phone]              Who to contact     Please call your clinic at [phone] to:    Ask questions about your health    Make or cancel appointments    Discuss your medicines    Learn about your test results    Speak to your doctor            Additional Information About Your Visit        Care EveryWhere ID     This is your Care EveryWhere ID. This could be used by other organizations to access your [place] medical records  [id number]        Your Vitals Were     Last Period                   07/12/2018 (Approximate)            Blood Pressure from Last 3 Encounters:   11/12/18 92/61   10/23/18 93/61   10/16/18 95/61    Weight from Last 3 Encounters:   11/12/18 133 lb 9.6 oz (60.6 kg)   10/23/18 132 lb (59.9 kg)   10/16/18 130 lb 9.6 oz (59.2 kg)              We Performed the Following     AT RISK ANTEPARTUM MANAGEMENT     CARE COORDINATION          Today's Medication Changes          These changes are accurate as of 11/12/18 11:59 PM.  If you have any questions, ask your nurse  or doctor.               Start taking these medicines.        Dose/Directions    docusate sodium 100 MG tablet   Commonly known as:  COLACE   Used for:  Drug-induced constipation   Started by:  Mira Klein MD        Dose:  100 mg   Take 100 mg by mouth daily   Quantity:  60 tablet   Refills:  1         These medicines have changed or have updated prescriptions.        Dose/Directions    ferrous sulfate 325 (65 Fe) MG tablet   Commonly known as:  IRON   This may have changed:  when to take this   Used for:  Anemia due to blood loss, acute   Changed by:  Mira Klein MD        Dose:  325 mg   Take 1 tablet (325 mg) by mouth 2 times daily   Quantity:  30 tablet   Refills:  3            Where to get your medicines      These medications were sent to [facility] [place], [place] - [address], [place] [address]     Phone:  [phone]     docusate sodium 100 MG tablet    ferrous sulfate 325 (65 Fe) MG tablet                Primary Care Provider    Mira PANCHAL MD       [address] [address]        Equal Access to Services     SONI[facility] Center[facility]GG AH: Hadii margareth nichols hadasho Soomaali, waaxda luqadaha, qaybta kaalmada adeegyada, waxay ni keller . So [facility] [phone].    ATENCIÓN: Si habla español, tiene a ansari disposición servicios gratuitos de asistencia lingüística. [facility] [phone].    We comply with applicable federal civil rights laws and [place] laws. We do not discriminate on the basis of race, color, national origin, age, disability, sex, sexual orientation, or gender identity.            Thank you!     Thank you for choosing [facility]  for your care. Our goal is always to provide you with excellent care. Hearing back from our patients is one way we can continue to improve our services. Please take a few minutes to complete the written survey that you may receive in the mail after your visit with us. Thank  you!             Your Updated Medication List - Protect others around you: Learn how to safely use, store and throw away your medicines at www.disposemymeds.org.          This list is accurate as of 11/12/18 11:59 PM.  Always use your most recent med list.                   Brand Name Dispense Instructions for use Diagnosis    docusate sodium 100 MG tablet    COLACE    60 tablet    Take 100 mg by mouth daily    Drug-induced constipation       ferrous sulfate 325 (65 Fe) MG tablet    IRON    30 tablet    Take 1 tablet (325 mg) by mouth 2 times daily    Anemia due to blood loss, acute       Prenatal Vitamin 27-0.8 MG Tabs     100 tablet    Take 1 tablet by mouth daily    Pregnancy test positive          Closure 2 Information: This tab is for additional flaps and grafts, including complex repair and grafts and complex repair and flaps. You can also specify a different location for the additional defect, if the location is the same you do not need to select a new one. We will insert the automated text for the repair you select below just as we do for solitary flaps and grafts. Please note that at this time if you select a location with a different insurance zone you will need to override the ICD10 and CPT if appropriate.

## 2020-04-15 ENCOUNTER — TELEPHONE (OUTPATIENT)
Dept: FAMILY MEDICINE | Facility: CLINIC | Age: 35
End: 2020-04-15

## 2020-04-15 NOTE — TELEPHONE ENCOUNTER
Reached out to patient during COVID19 Clinic outreach. Reassured patient that [facility] is still open and has started implementing phone and video appointments to help patient remain safe at home.     Patient reports the following concerns: n/a    Per patient request, patient is scheduled for a visit to address their concerns on the following date: n/a     Offered MyChart. .    Called and LM. Letter sent.     David Jacobs, CMA

## 2020-04-15 NOTE — LETTER
April 15, 2020      Kendrick Jacobs  [address] [place] [address]        Dear Kendrick,      We tried reaching you by phone but were unable to connect with you. We are reaching out to see how you are doing. This is a very stressful time in the world, which can cause an increase in personal stress and anxiety.     Our clinic is open.  We are here for you and are ready to meet all of your healthcare needs.  We have delayed preventive care until July.  We want everyone who can to stay home during this time for their health and the health of all.  We are now having most visits over the phone, but will see people in person if your doctor agrees that it is necessary.  We also will have video visits starting on April 6, 2020.      Call us with any questions or concerns you may have, and know that we are all in this together.       Sincerely,     Your team at [facility]  [phone]

## 2020-10-26 ENCOUNTER — OFFICE VISIT (OUTPATIENT)
Dept: FAMILY MEDICINE | Facility: CLINIC | Age: 35
End: 2020-10-26
Payer: COMMERCIAL

## 2020-10-26 VITALS
SYSTOLIC BLOOD PRESSURE: 93 MMHG | HEART RATE: 123 BPM | TEMPERATURE: 98.1 F | RESPIRATION RATE: 16 BRPM | WEIGHT: 128.2 LBS | HEIGHT: 58 IN | DIASTOLIC BLOOD PRESSURE: 58 MMHG | BODY MASS INDEX: 26.91 KG/M2

## 2020-10-26 DIAGNOSIS — N30.01 ACUTE CYSTITIS WITH HEMATURIA: ICD-10-CM

## 2020-10-26 DIAGNOSIS — O20.9 FIRST TRIMESTER BLEEDING: ICD-10-CM

## 2020-10-26 DIAGNOSIS — N93.9 VAGINAL SPOTTING: Primary | ICD-10-CM

## 2020-10-26 LAB
B-HCG SERPL-ACNC: ABNORMAL MLU/ML (ref 0–4)
BACTERIA: NORMAL
BILIRUBIN UR: NEGATIVE MG/DL
BLOOD UR: ABNORMAL MG/DL
CASTS: NORMAL /LPF
CRYSTAL URINE: NORMAL /LPF
EPITHELIAL CELLS UR: NORMAL /LPF (ref 0–2)
GLUCOSE URINE: NEGATIVE
HCG UR QL: POSITIVE
KETONES UR QL: NEGATIVE MG/DL
LEUKOCYTE ESTERASE UR: ABNORMAL
MUCOUS URINE: NORMAL LPF
NITRITE UR QL STRIP: NEGATIVE MG/DL
PH UR STRIP: 5.5 [PH] (ref 4.5–8)
PROTEIN UR: NEGATIVE MG/DL
RBC URINE: <2 /HPF
SP GR UR STRIP: >=1.03 (ref 1–1.03)
UROBILINOGEN UR STRIP-ACNC: ABNORMAL E.U./DL
WBC URINE: NORMAL /HPF

## 2020-10-26 PROCEDURE — 81025 URINE PREGNANCY TEST: CPT | Performed by: STUDENT IN AN ORGANIZED HEALTH CARE EDUCATION/TRAINING PROGRAM

## 2020-10-26 PROCEDURE — 81001 URINALYSIS AUTO W/SCOPE: CPT | Performed by: STUDENT IN AN ORGANIZED HEALTH CARE EDUCATION/TRAINING PROGRAM

## 2020-10-26 PROCEDURE — 99213 OFFICE O/P EST LOW 20 MIN: CPT | Mod: GC | Performed by: STUDENT IN AN ORGANIZED HEALTH CARE EDUCATION/TRAINING PROGRAM

## 2020-10-26 ASSESSMENT — MIFFLIN-ST. JEOR: SCORE: 1175.23

## 2020-10-26 NOTE — PATIENT INSTRUCTIONS
-Will have results of urine soon  -Will take one hcg level today in your blood and please return on Wednesday/Thursday for the next draw  -Ordered a transvaginal ultrasound to confirm pregnancy, we should do this tomorrow. Someone from our clinic will be in touch with you soon.    Thank you,    Dr. Lamar    10/28/20   [facility]  Phone: [phone]  Fax: [phone]    [facility]  [facility], [address]  [place]  [address]    Appointment:  Wednesday October 28th  Arrival Time:  4:30pm    Prep: Full Bladder    Please bring a copy of your insurance card and photo ID    If you cannot make this appointment, please call [phone] to reschedule    Referral and demographics faxed to [phone]

## 2020-10-26 NOTE — Clinical Note
Khris France,    I placed an order for a transvaginal ultraosound for Kendrick Jacobs. Just wanted to forward you the chart and hope we can schedule an ultrasound for her in the next day or two.    Thanks,  Alisa Lamar

## 2020-10-26 NOTE — Clinical Note
"Results communicated. Unable to find the \"results communication\" portion of the notes to document in but I wrote a few notes. Let me know if there is anything else that I need to to.  ThanksAnita"

## 2020-10-26 NOTE — PROGRESS NOTES
Preceptor Attestation:    Patient seen and evaluated in person. I discussed the patient with the resident. I have verified the content of the note, which accurately reflects my assessment of the patient and the plan of care.   Supervising Physician:  Thierry Reece MD.

## 2020-10-26 NOTE — PROGRESS NOTES
ASSESSMENT AND PLAN     1. Vaginal spotting  2. First trimester bleeding  Patient reports bright red vaginal spotting since her LMP in August/September. She has not experienced any abdominal cramping or other symptoms. She has taken 6 positive pregnancy tests at home. Discussed with patient that will confirm pregnancy today along with a beta-hcg quant. She should have a transvaginal ultrasound this week to confirm an intrauterine pregnancy and make sure no ectopic pregnancy has occurred or miscarriage is occurring. Can come back to clinic on Thursday/Friday to have a repeat beta-hcg quant.   - Urinalysis, Micro If ([facility])  - Beta-HCG Quantitative ([facility])  - US OB TRANSVAGINAL; Future  - Urine Microscopic (P )  - HCG Qualitative Urine (UPT)  ([facility])    RTC in Thursday/Friday for a lab only visit.     The patient was seen by, and discussed with, Dr. Thierry Reece, who agrees with the plan.    Alisa Lamar MD PGY3  [facility]         SUBJECTIVE       Kendrick Jacobs is a 35 year old  female with a PMH significant for:     Patient Active Problem List   Diagnosis     Chronic low back pain     Abnormal pregnancy US     Abnormal Pap smear of cervix     She presents with vaginal spotting.    She feels that her LMP was around August or September of 2020. She reports she took 6 positive at home pregnancy tests. She reports since she found out she was pregnant, has been experiencing vaginal spotting with bright red blood when she wipes. She denies fever/chills, abdominal pain, nausea/vomiting, headache, palpitations, chest pain, shortness of breath. This will be her 8th child.     She also reports a few days of dysuria and urinary urgency/frequency.       PMH, Medications and Allergies were reviewed and updated as needed.        REVIEW OF SYSTEMS     ROS reviewed in HPI.         OBJECTIVE     Vitals:    10/26/20 1545   BP: 93/58   Pulse: 123   Resp: 16   Temp: 98.1  F (36.7  C)   Weight: 58.2 kg (128 lb 3.2  "oz)   Height: 1.48 m (4' 10.25\")     Body mass index is 26.56 kg/m .    General: Alert, well appearing, no acute distress  Lungs: Clear to auscultation bilaterally, no wheezing, no rhonchi, no crackles  CV: Regular rate and rhythm, no murmur, no rubs, no gallops  Abdomen: Soft, nontender, non-distended, no masses palpated, normal bowel sounds  : Declined  exam.  Skin: Dry, no cyanosis, no abrasions, no discoloration.    No results found for this or any previous visit (from the past 24 hour(s)).        "

## 2020-10-27 ENCOUNTER — TELEPHONE (OUTPATIENT)
Dept: FAMILY MEDICINE | Facility: CLINIC | Age: 35
End: 2020-10-27

## 2020-10-27 NOTE — TELEPHONE ENCOUNTER
[facility] phone call message- patient requesting results:    Test:LAB     Date of test: 10/26/2020     Additional Comments: Pt would like a callback with results.     OK to leave a message on voice mail? Yes    Primary language: English      needed? No    Call taken on October 27, 2020 at 1:02 PM by Grisel Flores-Cardona

## 2020-10-28 NOTE — PROGRESS NOTES
Patient called regarding her results. Read the letter from Dr Lamar to patient. Discussed where the prescription was sent. Also spoke about the U/S, patient is hoping to have it done sooner than Monday because she will be going out of town. Edilma, referral coordinator, was unavailable but I informed patient that I would communicate that to her.    Routed note to Rodolfo.    Anita Rosario

## 2020-10-28 NOTE — RESULT ENCOUNTER NOTE
"Khris Mayer or Rosibel,    Tried calling patient a couple of times this morning regarding her results. Would one of you be able to call patient back? This is what I would like to relay to them:    \"Hello,    The results of your labs show that you are pregnant, approximately 6-10 weeks, however, it is difficult to tell. You also have a urinary tract infection which may be the cause of your vaginal spotting. I would like you to come back in to clinic to recheck your pregnancy hormone level to make sure it is increasing. Also, hope you have been contacted about your transvaginal ultrasound to confirm pregnancy. I will try to call back if you have any other questions.    Thanks,  Dr. Lamar\""

## 2020-10-28 NOTE — TELEPHONE ENCOUNTER
Patient called in again for her results. Read her the results note from Dr Lamar. Discussed her prescription. We also discussed her U/S referral. Patient plans on calling Edilma back this afternoon to talk about scheduling, Monday will not work for her and she is hoping to have it done sooner.    Anita Rosario

## 2020-10-28 NOTE — RESULT ENCOUNTER NOTE
Augmentin twice daily for 7 day prescription sent to pharmacy. Please let patient know if calls back.     Alisa Lamra MD PGY3  [facility]

## 2020-10-29 DIAGNOSIS — N93.9 VAGINAL SPOTTING: Primary | ICD-10-CM

## 2020-10-30 DIAGNOSIS — O20.9 FIRST TRIMESTER BLEEDING: ICD-10-CM

## 2020-10-30 DIAGNOSIS — N93.9 VAGINAL SPOTTING: ICD-10-CM

## 2020-11-02 ENCOUNTER — TELEPHONE (OUTPATIENT)
Dept: FAMILY MEDICINE | Facility: CLINIC | Age: 35
End: 2020-11-02

## 2020-11-02 NOTE — TELEPHONE ENCOUNTER
Return call made person answering the call stated patient is in [place] no additional information obtain call ended by person who answered the call    Note routed to Dr. Willard Gleason RN

## 2020-11-02 NOTE — TELEPHONE ENCOUNTER
[email or web address] phone call message- patient requesting results:    Test: Lab    Date of test: ?    Additional Comments: results.    OK to leave a message on voice mail? Yes    Primary language: English      needed? No    Call taken on November 2, 2020 at 11:28 AM by Kristen Ordaz

## 2020-11-04 NOTE — TELEPHONE ENCOUNTER
Impression from 10/28/20 US read to pt who voices understanding. Pt states she continues to have spotting and lower abd cramping. S/s of when to be seen at ER reviewed with pt.  Patient concerned that she continues to have spotting/cramping and would like to know why.     Patient is in [place] but will be returning in approx 2 weeks    Note routed to Dr.Aldrin Rosibel RAM

## 2020-11-04 NOTE — TELEPHONE ENCOUNTER
Called back [phone] please call her at this number/ she is still spotting and she had some pretty bad cramps this morning.

## 2020-11-05 NOTE — RESULT ENCOUNTER NOTE
Unable to reach patient via telephone. Results communicated to patient on 10/28.     Alisa Lamar MD PGY3  [facility]

## 2020-11-18 ENCOUNTER — TELEPHONE (OUTPATIENT)
Dept: FAMILY MEDICINE | Facility: CLINIC | Age: 35
End: 2020-11-18

## 2020-11-18 NOTE — TELEPHONE ENCOUNTER
"11/12/20 11:20 AM Pt states that she is currently in [place] and will be back to [place] in a couple weeks.  She is currently 13-14 weeks pregnant and states that during intercourse yesterday she had a large amount of bright red bleeding.  She said that it looked like \"someone was murdered\" and she passed a quarter size piece of tissue or blood clot.  She denied pain with intercourse and has not had any cramping or abd pain.  She states that since she found out she was pregnant she has had vaginal spotting on and off.  Reviewed ultrasound results from 10/30 which did show single living baby with a strong heartbeat.  Discussed that if she has any more heavy bleeding, any abd pain/cramping, or infection symptoms or any other concerns to call the clinic.  Told her that as soon as she returns to [place] she should make an appt to be seen.  Discussed the need to abstain from intercourse until she is seen.  Pt verbalized understanding and denied any further questions./NG  "

## 2020-12-04 ENCOUNTER — TELEPHONE (OUTPATIENT)
Dept: FAMILY MEDICINE | Facility: CLINIC | Age: 35
End: 2020-12-04

## 2020-12-04 DIAGNOSIS — N93.9 VAGINAL BLEEDING: Primary | ICD-10-CM

## 2020-12-04 NOTE — TELEPHONE ENCOUNTER
"Pt is +15 weeks pregnant (FAMILIA 5/26/21 based on 10 wk ultrasound done on 10/28/20) and states that she just got back from [place]. She has continued to have ongoing red spotting to period flow since her LMP in August/September.  It has been less than it was and was the worst during intercourse on 11/11/20 where it was a \"large\" amount of bright red bleeding.  She denies cramping.  Told her that she should schedule an appt to be seen and pt scheduled appt with Dr Lamar for today at 4:10 PM.  Pt needs to schedule NOB appt as well.  Routed note to Dr Lamar./JOVAN  "

## 2020-12-18 ENCOUNTER — OFFICE VISIT (OUTPATIENT)
Dept: FAMILY MEDICINE | Facility: CLINIC | Age: 35
End: 2020-12-18
Payer: COMMERCIAL

## 2020-12-18 ENCOUNTER — RECORDS - HEALTHEAST (OUTPATIENT)
Dept: ADMINISTRATIVE | Facility: OTHER | Age: 35
End: 2020-12-18

## 2020-12-18 VITALS
OXYGEN SATURATION: 99 % | HEART RATE: 113 BPM | SYSTOLIC BLOOD PRESSURE: 89 MMHG | TEMPERATURE: 98.2 F | BODY MASS INDEX: 27.85 KG/M2 | DIASTOLIC BLOOD PRESSURE: 57 MMHG | RESPIRATION RATE: 16 BRPM | WEIGHT: 134.4 LBS

## 2020-12-18 DIAGNOSIS — R51.9 ACUTE NONINTRACTABLE HEADACHE, UNSPECIFIED HEADACHE TYPE: ICD-10-CM

## 2020-12-18 DIAGNOSIS — K21.9 GASTROESOPHAGEAL REFLUX DISEASE WITHOUT ESOPHAGITIS: ICD-10-CM

## 2020-12-18 DIAGNOSIS — D62 ANEMIA DUE TO BLOOD LOSS, ACUTE: ICD-10-CM

## 2020-12-18 DIAGNOSIS — O46.92 ANTEPARTUM BLEEDING, SECOND TRIMESTER: Primary | ICD-10-CM

## 2020-12-18 DIAGNOSIS — Z33.1 PREGNANCY, INCIDENTAL: ICD-10-CM

## 2020-12-18 DIAGNOSIS — R11.0 NAUSEA: ICD-10-CM

## 2020-12-18 LAB
BILIRUBIN UR: NEGATIVE MG/DL
BLOOD UR: NEGATIVE MG/DL
GLUCOSE URINE: NEGATIVE
HBA1C MFR BLD: 4.5 % (ref 0–5.7)
HCT VFR BLD AUTO: 26 % (ref 35–47)
HEMOGLOBIN: 7 G/DL (ref 11.7–15.7)
HIV 1+2 AB+HIV1 P24 AG SERPL QL IA: NEGATIVE
KETONES UR QL: NEGATIVE MG/DL
LEUKOCYTE ESTERASE UR: NEGATIVE
MCH RBC QN AUTO: 18.1 PG (ref 26.5–35)
MCHC RBC AUTO-ENTMCNC: 26.9 G/DL (ref 32–36)
MCV RBC AUTO: 67.4 FL (ref 78–100)
NITRITE UR QL STRIP: NEGATIVE MG/DL
PH UR STRIP: 5.5 [PH] (ref 4.5–8)
PLATELET # BLD AUTO: 261 K/UL (ref 150–450)
PROTEIN UR: ABNORMAL MG/DL
RBC # BLD AUTO: 3.9 M/UL (ref 3.8–5.2)
SP GR UR STRIP: >=1.03 (ref 1–1.03)
UROBILINOGEN UR STRIP-ACNC: ABNORMAL E.U./DL
WBC # BLD AUTO: 7.3 K/UL (ref 4–11)

## 2020-12-18 PROCEDURE — 36415 COLL VENOUS BLD VENIPUNCTURE: CPT | Performed by: STUDENT IN AN ORGANIZED HEALTH CARE EDUCATION/TRAINING PROGRAM

## 2020-12-18 PROCEDURE — 85027 COMPLETE CBC AUTOMATED: CPT | Performed by: STUDENT IN AN ORGANIZED HEALTH CARE EDUCATION/TRAINING PROGRAM

## 2020-12-18 PROCEDURE — 81003 URINALYSIS AUTO W/O SCOPE: CPT | Performed by: STUDENT IN AN ORGANIZED HEALTH CARE EDUCATION/TRAINING PROGRAM

## 2020-12-18 PROCEDURE — 99214 OFFICE O/P EST MOD 30 MIN: CPT | Mod: GC | Performed by: STUDENT IN AN ORGANIZED HEALTH CARE EDUCATION/TRAINING PROGRAM

## 2020-12-18 RX ORDER — PYRIDOXINE HCL (VITAMIN B6) 25 MG
25 TABLET ORAL DAILY
Qty: 30 TABLET | Refills: 4 | Status: SHIPPED | OUTPATIENT
Start: 2020-12-18 | End: 2020-12-29

## 2020-12-18 RX ORDER — MECOBALAMIN 5000 MCG
15 TABLET,DISINTEGRATING ORAL DAILY
Qty: 30 CAPSULE | Refills: 1 | Status: SHIPPED | OUTPATIENT
Start: 2020-12-18 | End: 2023-03-08

## 2020-12-18 RX ORDER — FERROUS SULFATE 325(65) MG
325 TABLET ORAL
Qty: 30 TABLET | Refills: 3 | Status: SHIPPED | OUTPATIENT
Start: 2020-12-18 | End: 2023-03-08

## 2020-12-18 NOTE — PATIENT INSTRUCTIONS
Dr Jacobs does OMT for headaches at our clinic.    Schedule new OB visit and ultrasound as soon as possible.

## 2020-12-18 NOTE — Clinical Note
She has been bleeding and has severe anemia. Discussed that if she bleeds anymore she should go to the ED. Also discussed that if she's lightheaded, dizzy, or feels her heart racing she should go to the ED. Baby sounded fine today on exam. She is trying to have an ultrasound schedule. I'm concerned about previa. I can put in a standing future order for a hemoglobin here, but it seems that it would be best for her to go the ED with more symptoms. I'm at [facility] on Monday for OB as well. Let me know if you see anything I missed.

## 2020-12-18 NOTE — PROGRESS NOTES
SUBJECTIVE       Kendrick Jacobs is a 35 year old  female with a PMH significant for:     Patient Active Problem List   Diagnosis     Chronic low back pain     Abnormal pregnancy US     Abnormal Pap smear of cervix     She presents with bleeding with pregnancy. She had bleeding after sexual intercourse. It was all over the bed and floor. She doesn't have hemorrhoids. She is having tension headaches, which aren't being treated well with acetaminophen. She wanted to take Advil but didn't have any. Discussed that she should avoid NSAIDs in pregnancy. She is having acid reflux symptoms and early satiety. She is also still have morning sickness and finds it difficult to eat. She is feeling baby move. She is not taking prenatal vitamins because she doesn't like they way they make her feel.    She has been assessed a little for vaginal bleeding in pregnancy, but hasn't found anything. She does have other children and hasn't had this issue in pregnancy before.    LMP 8/15/2020 and had an ultrasound at around 10 weeks GA.    PMH, Medications and Allergies were reviewed and updated as needed.        REVIEW OF SYSTEMS     See HPI        OBJECTIVE     Vitals:    12/18/20 1551   BP: (!) 89/57   BP Location: Left arm   Patient Position: Sitting   Cuff Size: Adult Regular   Pulse: 113   Resp: 16   Temp: 98.2  F (36.8  C)   TempSrc: Oral   SpO2: 99%   Weight: 61 kg (134 lb 6.4 oz)     Body mass index is 27.85 kg/m .    Constitutional: Awake, alert, cooperative, no apparent distress.  Eyes: Lids and lashes normal.  ENT: Normocephalic, wearing mask over mouth and nose throughout interview.  Lungs: No increased work of breathing.  Abdomen: Gravid. , fundal height 19.5cm.  Neuropsychiatric: Normal affect, mood, orientation, memory and insight.  Skin: No rashes, erythema, pallor, petechia or purpura.    Results for orders placed or performed in visit on 12/18/20   CBC with Plt ([facility])     Status: Abnormal   Result Value Ref  Range    WBC 7.3 4.0 - 11.0 K/uL    RBC 3.9 3.8 - 5.2 M/uL    Hemoglobin 7.0 (LL) 11.7 - 15.7 g/dL    Hematocrit 26.0 (LL) 35.0 - 47.0 %    MCV 67.4 (L) 78.0 - 100.0 fL    MCH 18.1 (L) 26.5 - 35.0    MCHC 26.9 (L) 32.0 - 36.0 g/dL    Platelets 261.0 150.0 - 450.0 K/uL    Narrative    Critical Value was reported to and read back by Dr. Klein at 12/18/2020 4:53   PM (lx/cma)   Urinalysis([facility])     Status: Abnormal   Result Value Ref Range    Specific Gravity Urine >=1.030 1.005 - 1.030    pH Urine 5.5 4.5 - 8.0    Leukocyte Esterase UR Negative NEGATIVE    Nitrite Urine Negative NEGATIVE mg/dL    Protein UR Trace (A) NEGATIVE mg/dL    Glucose Urine Negative NEGATIVE    Ketones Urine Negative NEGATIVE mg/dL    Urobilinogen mg/dL 0.2 E.U./dL 0.2 E.U./dL E.U./dL    Bilirubin UR Negative NEGATIVE mg/dL    Blood UR Negative NEGATIVE mg/dL           ASSESSMENT AND PLAN     Kendrick was seen today for follow up.    Diagnoses and all orders for this visit:    Antepartum bleeding, second trimester: ongoing issue for patient. She has not had her first OB visit, but has had bleeding that has been assessed a little bit. She did not have a subchorionic hemorrhage in the first trimester. At this point concerned about placenta previa and will have that assessed with ultrasound. Will also do fetal survey since she's about 18 weeks now.  and fundal height 19.5cm.  -     US OB >14 Weeks Follow Up; Future  -     Cancel: Hemoglobin (HGB) ([facility])  -     Recommended pelvic rest until we see if she has a placenta previa    Pregnancy, incidental: has not had first OB so will grab some labs since she's in clinic. Recommended patient schedule first OB visit.  -     ABO/Rh Type-HML ([facility])  -     Antibody Screen ([facility])  -     CBC with Plt ([facility])  -     Culture Urine ([facility])  -     Hepatitis B Surface Ag ([facility])  -     HIV Ag/Ab Screen [place] ([facility])  -     Lead, Blood ([facility])  -     Rubella  IgG  ([facility])  -     Syphilis Screen [place] ([facility])  -     Urinalysis([facility])  -     Hepatitis B Surface Ab ([facility])  -     Glycosylated Hgb A1C ([facility])    Nausea  -     pyridOXINE (VITAMIN B6) 25 MG tablet; Take 1 tablet (25 mg) by mouth daily  -     doxylamine (UNISOM) 25 MG TABS tablet; Take 1 tablet (25 mg) by mouth 2 times daily as needed for other (nausea)    Gastroesophageal reflux disease without esophagitis  -     LANsoprazole (PREVACID) 15 MG DR capsule; Take 1 capsule (15 mg) by mouth daily    Acute nonintractable headache, unspecified headache type: discussed that she should not take Advil, Motrin, Naproxen, or other NSAIDs for headaches. Discussed that she can take acetaminophen, which she does without relief. Discussed having her see Dr Jacobs in our clinic for OMT  - Acetaminophen prn for headaches  - Schedule OMT for assessment by Dr Jacobs    Called patient with critical lab value of 7.0 hemoglobin. Sent in ferrous sulfate and told patient to go to the emergency department if she has any more bleeding. Also discussed symptoms of severe anemia and that she might need to do iron transfusions to build up her blood.      RTC in 3 days for follow up of vaginal bleeding or sooner if develops new or worsening symptoms.    Mira Klein MD PGY3

## 2020-12-19 LAB
ABO + RH BLD: NORMAL
BLD GP AB SCN SERPL QL: NEGATIVE
HBV SURFACE AG SERPL QL IA: NEGATIVE
REPEAT ABO/RH TYPING (HML): NORMAL
TREPONEMA ANTIBODY (SYPHILIS): NEGATIVE

## 2020-12-20 LAB — CULTURE: NORMAL

## 2020-12-21 LAB
COLLECTION METHOD: NORMAL
HBV SURFACE AB SER-ACNC: NEGATIVE M[IU]/ML
LEAD BLD-MCNC: NORMAL UG/DL
LEAD BLDV-MCNC: <2 UG/DL
RUBV IGG SERPL QL IA: POSITIVE

## 2020-12-22 ENCOUNTER — TELEPHONE (OUTPATIENT)
Dept: FAMILY MEDICINE | Facility: CLINIC | Age: 35
End: 2020-12-22

## 2020-12-22 PROBLEM — O28.3 ABNORMAL PREGNANCY US: Status: RESOLVED | Noted: 2018-12-11 | Resolved: 2020-12-22

## 2020-12-22 NOTE — RESULT ENCOUNTER NOTE
Discussed critical value with patient already. Sent iron to pharmacy. Discussed going to ED immediately if she has more bleeding. Will review remaining results with patient at next visit.    Mira Klein MD

## 2020-12-22 NOTE — TELEPHONE ENCOUNTER
[facility] for pt to let her know that she can come at 1:20 PM on Monday, 12/28/20 for labs/OB educator, then ultrasound at 2:20 PM and NOB with Dr Klein at 3:10 PM./NG

## 2020-12-27 NOTE — PROGRESS NOTES
First Obstetric Visit       HPI       Kendrick Jacobs is a 35 year old woman who presents for an initial prenatal visit at Unknown weeks of pregnancy with FAMILIA of 2021 by 10 week ultrasound of Patient's last menstrual period was 08/15/2020 (within days).    She has had bleeding since her LMP.  She has been assessed in the ED and had an ultrasound today to assess as well. Ultrasound showed vascularized cervix. Discussed referral to OB/Gyn for further evaluation and recommendations, which she was agreeable to doing.  She has not had nausea.   Weight loss has not occurred.    This was not a planned pregnancy.     OTHER CONCERNS: Bleeding              Labor Risk Assessment     Is the patient's age <18 or >40?     No  Patint's BMI is Body mass index is 28.3 kg/m .   Does patient have a BMI < 18.5?     No  Prior delivery within 6 months?      No  Ever delivered prior to 37 weeks gestation?  No  Pregnancy occur via In Vitro Fertilization?   No  Are you carrying twins?       No    The patient has the following additional risk factors for  labor:   Q2: BMI <18.5    as documented     Labor Risk Summary:  Pt is high risk for  Labor  Yes, cervix appears to be shortened and vascularized                    Past Medical History     Past Medical History:   Diagnosis Date     NO ACTIVE PROBLEMS      See nurse history note, reviewed in detail.             Current Medications      Current Outpatient Medications   Medication Sig Dispense Refill     ferrous sulfate (FEROSUL) 325 (65 Fe) MG tablet Take 1 tablet (325 mg) by mouth daily (with breakfast) 30 tablet 3     LANsoprazole (PREVACID) 15 MG DR capsule Take 1 capsule (15 mg) by mouth daily 30 capsule 1     Prenatal Vit-Fe Fumarate-FA (PRENATAL VITAMIN) 27-0.8 MG TABS Take 1 tablet by mouth daily 100 tablet 3     doxylamine (UNISOM) 25 MG TABS tablet Take 1 tablet (25 mg) by mouth 2 times daily as needed for other (nausea) (Patient not taking: Reported on  12/28/2020) 30 tablet 1     ferrous sulfate (FEROSUL) 325 (65 Fe) MG tablet Take 1 tablet (325 mg) by mouth 2 times daily (Patient not taking: Reported on 12/12/2019) 30 tablet 3     pyridOXINE (VITAMIN B6) 25 MG tablet Take 1 tablet (25 mg) by mouth daily (Patient not taking: Reported on 12/28/2020) 30 tablet 4             Review of Systems      ROS:  No - Headache  No - Changes in vision  No - Chest Pain  Yes - Shortness of Breath  No - Nausea   No - Vomiting  No - Abdominal pain   No - Contractions  No - Dysuria   YES - Vaginal Discharge    No - Vaginal bleeding   No - Loss of Fluid   No - Extremity swelling     ====================================================           Physical Exam      /69   Pulse 84   Temp 98  F (36.7  C)   Resp 16   Wt 62 kg (136 lb 9.6 oz)   LMP 08/15/2020 (Within Days)   BMI 28.30 kg/m    GENERAL: healthy, alert and no distress  NECK: no asymmetry  RESP: breathing comfortably on room air, mask over mouth and nose  CV: appears well perfused  ABDOMEN: soft,  MS: extremities- no gross deformities noted, no edema  - female: declined, see ultrasound results from today's visit    Past labs reviewed:  A POS  Varicella immune Yes  Hepatitis B immune No  Last pap 2018.  She needs to follow up on this result from last year.    =========================================         Assessment and Plan     Kendrick was seen today for prenatal care.    Diagnoses and all orders for this visit:    19 weeks gestation of pregnancy  -     Cancel: Hemoglobin (HGB) (P )  -     Cancel: Vitamin B12 ([facility])  -     Hepatitis B Surface Ab ([facility])  -     OB/GYN REFERRAL; Future  -     AFP 4-Marker Scn ([facility])    Iron deficiency anemia due to chronic blood loss  -     Hemoglobin (HGB) ([facility])  -     Vitamin B12 ([facility])    Vaginal bleeding  -     OB/GYN REFERRAL; Future    Ultrasound tech notified of abnormal vasculature around cervix and patient agreeable to seeing OBGYN.  Recommended continued pelvic rest for patient and iron infusions for anemia, which she was agreeable to doing. Discussed that if she has any more vaginal bleeding she should go to the ED immediately, which she was agreeable to doing.     35 year old  19w2d of pregnancy with FAMILIA of 2021 by 10 week ultrasound. Patient's last menstrual period was 08/15/2020 (within days).    Pregnancy Risk Assessment: High Risk pregnancy  Discussed high risk conditions as follows: vascularized cervix    -Dating US obtained and dating confirmed?   Yes  -Taking PNV/Folate?     No      - Ordered new OB labs: blood type and antibody screen, HIV, VDRL, hep B, rubella, UA/UC, Hepatitis B immunity done.    - Patient presented late for first OB appointment so doing second trimester quad screening.  - Refer for OB/Gyn due to vascular cervix  - Discussed genetic screening. Patient does want to pursue screening.   - Discussed screening for sickle cell anemia. Patient does not  want to pursue Hb electrophoresis.     - Discussed early screening for gestational diabetes. The patient does have a history of GDM, BMI>30, h/o prediabetes/glucose intolerance, first degree relative with GDM or DM, or chronic HTN, so WILL obtain early GCT or A1c.    - The patient does not h/o severe, early preeclampsia with delivery <34weeks, preeclampsia in more than one pregnancy, pre-gestational diabetes, chronic hypertension, renal disease, or autoimmune disease so WILL NOT start low dose aspirin (81mg) and calcium supplementation (1g-1.5g/day elemental = 2500mg- 3750mg/day Calcium carbonate) to prevent preeclampsia.    - The patient  does not have a history of spontaneous  birth so  WILL NOT consider progesterone starting at 16-20 weeks and/or serial transvaginal cervical length ultrasounds from 16-24 weeks.    - Prenatal vitamins ordered.  - Flu shot offered and was Declined.    Counseling given:   - Follow up in 2 weeks for return OB visit.  -  Recommended weight gain for pregnancy: 15-25 lbs (pregravid BMI 25-29.9)      - Instructed on best evidence for: healthy diet and foods to avoid; exercise and activity during pregnancy; avoiding exposure to toxoplasmosis; safe use of seatbelts during pregnancy; and maintenance of a generally healthy lifestyle  -Patient to see OB educator/ RN today and/or next visit.    Discussed the harms, benefits, side effects and alternative therapies for current prescribed and OTC medications.      Options for treatment and follow-up care were reviewed with the patient and/or guardian. Kendrick Jacobs and/or guardian engaged in the decision making process and verbalized understanding of the options discussed and agreed with the final plan.    Mira Klein MD PGY3

## 2020-12-28 ENCOUNTER — ANCILLARY PROCEDURE (OUTPATIENT)
Dept: ULTRASOUND IMAGING | Facility: CLINIC | Age: 35
End: 2020-12-28
Attending: FAMILY MEDICINE
Payer: COMMERCIAL

## 2020-12-28 ENCOUNTER — ALLIED HEALTH/NURSE VISIT (OUTPATIENT)
Dept: FAMILY MEDICINE | Facility: CLINIC | Age: 35
End: 2020-12-28
Payer: COMMERCIAL

## 2020-12-28 ENCOUNTER — RECORDS - HEALTHEAST (OUTPATIENT)
Dept: ADMINISTRATIVE | Facility: OTHER | Age: 35
End: 2020-12-28

## 2020-12-28 ENCOUNTER — OFFICE VISIT (OUTPATIENT)
Dept: FAMILY MEDICINE | Facility: CLINIC | Age: 35
End: 2020-12-28
Payer: COMMERCIAL

## 2020-12-28 DIAGNOSIS — O99.019 ANEMIA AFFECTING EIGHTH PREGNANCY: ICD-10-CM

## 2020-12-28 DIAGNOSIS — D64.9 SEVERE ANEMIA: ICD-10-CM

## 2020-12-28 DIAGNOSIS — O34.32 CERVIX FUNNELING COMPLICATING PREGNANCY, SECOND TRIMESTER: ICD-10-CM

## 2020-12-28 DIAGNOSIS — Z3A.19 19 WEEKS GESTATION OF PREGNANCY: Primary | ICD-10-CM

## 2020-12-28 DIAGNOSIS — D50.0 IRON DEFICIENCY ANEMIA DUE TO CHRONIC BLOOD LOSS: ICD-10-CM

## 2020-12-28 DIAGNOSIS — O09.40 ANEMIA AFFECTING EIGHTH PREGNANCY: ICD-10-CM

## 2020-12-28 DIAGNOSIS — N93.9 VAGINAL BLEEDING: ICD-10-CM

## 2020-12-28 DIAGNOSIS — O46.92 ANTEPARTUM BLEEDING, SECOND TRIMESTER: ICD-10-CM

## 2020-12-28 DIAGNOSIS — O09.40 GRAND MULTIPARITY WITH CURRENT PREGNANCY, ANTEPARTUM: ICD-10-CM

## 2020-12-28 DIAGNOSIS — O09.90 SUPERVISION OF HIGH RISK PREGNANCY, ANTEPARTUM: ICD-10-CM

## 2020-12-28 DIAGNOSIS — O46.92 VAGINAL BLEEDING IN PREGNANCY, SECOND TRIMESTER: ICD-10-CM

## 2020-12-28 DIAGNOSIS — O26.879 CERVIX, SHORT (AFFECTING PREGNANCY): ICD-10-CM

## 2020-12-28 LAB
HEMOGLOBIN: 7.3 G/DL (ref 11.7–15.7)
VIT B12 SERPL-MCNC: 334 PG/ML (ref 213–816)

## 2020-12-28 PROCEDURE — 99214 OFFICE O/P EST MOD 30 MIN: CPT | Mod: GC | Performed by: STUDENT IN AN ORGANIZED HEALTH CARE EDUCATION/TRAINING PROGRAM

## 2020-12-28 PROCEDURE — 99207 PR NO CHARGE NURSE ONLY: CPT

## 2020-12-28 PROCEDURE — 85018 HEMOGLOBIN: CPT | Performed by: STUDENT IN AN ORGANIZED HEALTH CARE EDUCATION/TRAINING PROGRAM

## 2020-12-28 PROCEDURE — 36415 COLL VENOUS BLD VENIPUNCTURE: CPT | Performed by: STUDENT IN AN ORGANIZED HEALTH CARE EDUCATION/TRAINING PROGRAM

## 2020-12-28 SDOH — ECONOMIC STABILITY: FOOD INSECURITY: WITHIN THE PAST 12 MONTHS, THE FOOD YOU BOUGHT JUST DIDN'T LAST AND YOU DIDN'T HAVE MONEY TO GET MORE.: NEVER TRUE

## 2020-12-28 SDOH — ECONOMIC STABILITY: TRANSPORTATION INSECURITY
IN THE PAST 12 MONTHS, HAS THE LACK OF TRANSPORTATION KEPT YOU FROM MEDICAL APPOINTMENTS OR FROM GETTING MEDICATIONS?: NO

## 2020-12-28 SDOH — ECONOMIC STABILITY: INCOME INSECURITY: HOW HARD IS IT FOR YOU TO PAY FOR THE VERY BASICS LIKE FOOD, HOUSING, MEDICAL CARE, AND HEATING?: NOT HARD AT ALL

## 2020-12-28 SDOH — ECONOMIC STABILITY: TRANSPORTATION INSECURITY
IN THE PAST 12 MONTHS, HAS LACK OF TRANSPORTATION KEPT YOU FROM MEETINGS, WORK, OR FROM GETTING THINGS NEEDED FOR DAILY LIVING?: NO

## 2020-12-28 SDOH — ECONOMIC STABILITY: FOOD INSECURITY: WITHIN THE PAST 12 MONTHS, YOU WORRIED THAT YOUR FOOD WOULD RUN OUT BEFORE YOU GOT MONEY TO BUY MORE.: NEVER TRUE

## 2020-12-28 NOTE — PROGRESS NOTES
Preceptor attestation:  Vital signs reviewed: /69   Pulse 84   Temp 98  F (36.7  C)   Resp 16   Wt 62 kg (136 lb 9.6 oz)   LMP 08/15/2020 (Within Days)   BMI 28.30 kg/m      Patient seen, evaluated, and discussed with the resident.  I have verified the content of the note, which accurately reflects my assessment of the patient and the plan of care.    Supervising physician: Anat Ferro MD  [facility]

## 2020-12-28 NOTE — PROGRESS NOTES
Past Medical History     Do you have a history of any of the following medical conditions?    Condition No/Yes/Details   Hypertension No    Heart disease, mitral valve prolapse, rheumatic fever No    Asthma or another chronic lung disease No    An autoimmune disorder No    Kidney disease No    Frequent urinary tract infections No    Epilepsy, seizures, or spells No    Migraine headaches No    Stroke, loss of sensation/function, seizures, or other neuro problem  YES numbness in thumb when wake up in the morning and sometimes when holding cell phone.   Diabetes No    Thyroid problems or have you taken thyroid medication No    Hepatitis, liver disease, jaundice No    Blood clots, phlebitis, pulmonary embolism or varicose veins No    Excessive bleeding after surgery or dental work No    Do you have more bleeding than other women after a cut or a scratch? No    Anemia  YES 12/18/20 hgb 7.0   Blood transfusions No         Would you refuse a blood transfusion?       No   If yes, then ask next question. If no, skip next question.   Would you rather die than receive a blood transfusion? No    Breast problems No    Have you ever ? No plans to bottle feed   Abnormalities of the uterus No    Abnormal pap smear  YES 11/2018 ASCUS + other high risk HPV   Have you ever been treated for depression? No    Are you having problems with crying spells or loss of self-esteem? No    Have you ever required psychiatric care? No    Have you been physically, sexually, or emotionally hurt by someone? No    Have you been in a major accident or suffered serious trauma? No    Have you ever had any gynecological surgical procedures such as cervical conization, LEEP, laser treatment, cryosurgery of the cervix, or a dilatation and curettage? No    Have you had any other surgical procedures? No         Have you ever had any complications from anesthesia? No    Have you ever been hospitalized for a nonsurgical reason? No              Substance use and exposure     Does anyone in your home smoke? No    Do you use tobacco products or betel nut? No    Do you drink beer, wine, or hard liquor? No    Do you use any of the following: marijuana, speed, cocaine, heroin, hallucinogens, or other drugs? No               Symptoms since Last Menstrual Period     Do you currently have any of the following symptoms: No/Yes/Details        Abdominal pain No         Blood in the stools or urine No         Chest pain No         Shortness of breath No         coughing or vomiting up blood No         Your heart racing or skipping beats No         Nausea or vomiting  YES resolving        Pain on urination No         Vaginal discharge or bleeding  YES vaginal bleeding off and on last very heavy in November               Genetic Screening          Is the patient 35 years or older? No      Do you have a history of any of the following No/Yes/Details        A metabolic disorder (e.g. Insulin-dependent DM, PKU) No         Recurrent pregnancy loss or still birth No      Do you, the baby's father, or anyone in your families have          Thalassemia AND MCV <80 No         Hemophilia No         Neural tube defect No }        Congenital heart defect No         Sickle cell disease or trait No         Muscular dystrophy No         Cystic fibrosis No         Mental retardation or autism No         Down's syndrome No         Clinton-Sach's disease No         Carver's chorea No         Any other inherited genetic or chromosomal disorder No         A child with birth defects not listed above  YES Son had a lung lesion.               Infection History     Ever treated for tuberculosis or had a positive skin test No    Ever had genital herpes (or has your partner) No    Had a rash or viral illness since LMP No    Ever had a sexually transmitted infection No    Ever had chicken pox or the vaccine  YES immune by titer   Have you had a sexual partner who is HIV positive No    Ever  had any other serious infectious disease No                Risk Assessment     Average Risk Category  No significant risk factors: Yes    At Risk Category (up to 3)  Teen pregnancy: No  Poor social situation: No  Domestic abuse: No  Financial difficulties: No  Smoker: No  H/O  deliver: No  H/O drug abuse: No  Non-English speaking: No  Advanced maternal age: No  GDM risks: Yes  Previous C/S: No  H/O PIH: No  H/O STIs: No  H/O mental health concerns: No  Onset care > 20 weeks: No      High Risk Category (4 or more At Risk or)  Diabetes/GDM: No  Multiple gestation: No  Chronic hypertension: No  Significant hx of asthma: No  Fetal demise > 20 weeks: No  Positive tox screen: No  Current mental health treatment: No      Risk: High Risk   Date determined: 20

## 2020-12-28 NOTE — LETTER
January 18, 2021      Kendrick Jacobs  [address] [place] [address]        Dear ,    We are writing to inform you of your test results.    Dear Kendrick Jacobs,      The results from your genetic testings did not show an increased risk of neural tube defects, down syndrome, or another genetic disorder call trisomy 18. We can talk more about thi at your next visit if you have quesitons. Your hemoglobin is still low, which we already talked about and set up the iron infusion appointments to help increase. Your B12 level is normal which is good. I look forward to seeing you at your next visit.     Thank you for allowing me to be a part of your health care!     Sincerely,   Mira Klein MD    Resulted Orders   Hepatitis B Surface Ab ([facility])   Result Value Ref Range    Hepatitis B Surface Antibody Negative Negative    Narrative    Test performed by:  [facility]  [address] [place] [address]   Hemoglobin (HGB) ([facility])   Result Value Ref Range    Hemoglobin 7.3 (LL) 11.7 - 15.7 g/dL    Narrative    Critical Value was reported to and read back by DR. Klein    at 12/28/2020   4:34 PM mvy   Vitamin B12 ([facility])   Result Value Ref Range    Vitamin B12 334 213 - 816 pg/mL    Narrative    Test performed by:  [facility]  [address] [place] [address]   AFP 4-Marker Scn ([facility])   Result Value Ref Range    Patient's AFP 79 ng/mL    Mom for AFP 1.44     Patient's UE3 2.28 ng/mL    Mom for UE3 0.94     Patient's HCG, 2nd Trimester 13948 IU/L    HCG Mom, 2nd Trimester 1.39     Patient's Linette 282 pg/mL    Mom for Linette 1.66     Maternal Screen Interpretation Screen Neg       Comment:      INTERPRETATION: SCREEN NEGATIVE                                Neural Tube Defects (NTD)   Negative       Down syndrome (DS)          Negative       Trisomy 18 (T18)            Negative                                                                  Pre-Test     Post-Test    Cutoff                                       Neural Tube Defects Risks   1:1030       1:3380       1:250          Down Syndrome Risks         1:312        1:3740       1:150          Trisomy 18 Risks            1:1220       < 1:09602    1:100                                        Comments:                                                   The risk of an open neural tube defect is less than the  screening cut-off.                                          The risk of Down syndrome is less than the screening  cut-off.                                                    The risk of trisomy 18 is less than the screening cut-off.  Test developed and characteristics determined by [employer]. See Compliance Statem ent B: [employer]/CS      Maternal [age] at Delivery 36.0 yr    Maternal Weight 136.0 lbs.     Estimated Due Date 05-22-21     Gestational Age Calculated at Collection 19 wks, 2 days     Dating Ultrasound     Number of Fetuses Jones     Maternal Race Nonblack     Insulin Req Maternal Diabetes No     Smoking No     Family Hx Neural Tube Defect No     Family History of Aneuploidy No     Specimen See Note       Comment:      Initial sample    EER Maternal Serun, Quad See Note       Comment:      Access [employer] Enhanced Report using the link below:     -Direct access: [email or web address]  Performed By: [place] [address]  : Landy Vázquez MD      Narrative    Test performed by:  [email or web address]  [address] [place] [id number]

## 2020-12-29 ENCOUNTER — COMMUNICATION - HEALTHEAST (OUTPATIENT)
Dept: ADMINISTRATIVE | Facility: HOSPITAL | Age: 35
End: 2020-12-29

## 2020-12-29 ENCOUNTER — AMBULATORY - HEALTHEAST (OUTPATIENT)
Dept: PHARMACY | Facility: CLINIC | Age: 35
End: 2020-12-29

## 2020-12-29 DIAGNOSIS — D50.0 IRON DEFICIENCY ANEMIA SECONDARY TO BLOOD LOSS (CHRONIC): ICD-10-CM

## 2020-12-29 PROBLEM — O09.90 SUPERVISION OF HIGH RISK PREGNANCY, ANTEPARTUM: Status: ACTIVE | Noted: 2020-12-28

## 2020-12-29 PROBLEM — O09.40 GRAND MULTIPARITY WITH CURRENT PREGNANCY: Status: ACTIVE | Noted: 2020-12-28

## 2020-12-29 LAB — HBV SURFACE AB SER-ACNC: NEGATIVE M[IU]/ML

## 2020-12-29 NOTE — NURSING NOTE
Family Medicine OB Education    I provided the following OB education to Kendrick Jacobs.    Discussed that Dr Klein should be the doctor that she sees for her prenatal visits and that Dr Klein will try hard to be the one to deliver her baby.  Discussed that if Dr Klein was unavailable that one of our other physicians would deliver the baby and that this could be a male or female provider.  Briefly discussed residency program and that multiple doctors would be present at time of delivery.  Sheet given and discussed fetal growth and development.  Sheet given and discussed warning signs with reasons to call clinic, L&D, or 24 hr HE  line with questions or concerns (phone numbers given).  Sheet given and discussed Tdap to be given after 27 weeks gestation and the importance of family members get immunized before delivery.  Proof of pregnancy completed and given to pt.  Gave pt preregistration form for hospital to complete.  See questionnaire and pregnancy risk assessment for further information in provider encounter.     Legal Screener completed and there were no concerns for government benefits, housing, or immigration.      Name of provider who requested the OB education: Dr Klein  Name of provider on site (faculty or community preceptor) at the time of performing the OB education: Dr Pillo Dhillon, RN, BSN

## 2020-12-29 NOTE — PATIENT INSTRUCTIONS
12/29/20   OB/GYN REFERRAL  [facility]  Phone: [phone]  Fax: [phone]    Referral, demographics, labs and office note faxed to [phone]. They will contact pt to schedule.     [facility]  [address] [place] [address]  Phone: [phone]    APPT: Wednesday, 1/6/21 at 1:30 PM with Dr John consult for short increased vascular cervix with funneling.        [facility]  [address] [place] [address]  Phone: [phone]    APPT: Wednesday, 1/6/21 at 2:30 PM for IV Venofer iron infusion.    APPT: Wednesday, 1/13/21 at 2:00 PM for IV Venofer iron infusion.

## 2020-12-30 ENCOUNTER — TELEPHONE (OUTPATIENT)
Dept: FAMILY MEDICINE | Facility: CLINIC | Age: 35
End: 2020-12-30

## 2020-12-30 DIAGNOSIS — O46.92 VAGINAL BLEEDING IN PREGNANCY, SECOND TRIMESTER: ICD-10-CM

## 2020-12-30 NOTE — TELEPHONE ENCOUNTER
Called [facility] and pt was scheduled for 1/12/21 with Dr Eddy.  She states that pt asked for this date.    Called pt and told her that she needs to be seen as soon as possible do to concerns for short cervix and risk for more bleeding.  Pt verbalized understanding and she states that she can do any day as long as it is after 1:00 PM since her kids are doing distant learning. Asked if she has been scheduled for iron infusions and she states that she has not.  Told her that nurse will set up both and call her back with info.    Called [facility] and was able to get her appt for Wed, 1/6/21 at 1:30 PM with Dr John for consult.    Called [facility] and was able to get an appt on 1/6/21 at 2:30 for IV Venofer and 2nd infusion scheduled on 1/13/21 at 2:00 PM.  They have moved and now have their own parking.  Pt should go past the hospital and look for cream colored sign for [facility] and check in at .    Gave pt appt info for OBGYN and iron infusions./NG

## 2020-12-31 LAB
# FETUSES US: NORMAL
AFP MOM SERPL: 1.44
AFP SERPL-MCNC: 79 NG/ML
AGE - REPORTED: 35 YR
CURRENT SMOKER: NO
FAMILY MEMBER DISEASES HX: NO
GA METHOD: NORMAL
GA: NORMAL WK
HCG MOM SERPL: 1.39
HCG SERPL-ACNC: NORMAL IU/L
HX OF HEREDITARY DISORDERS: NO
IDDM PATIENT QL: NO
INHIBIN A MOM SERPL: 1.66
INHIBIN A SERPL-MCNC: 282 PG/ML
INTEGRATED SCN PATIENT-IMP: NORMAL
PATHOLOGY STUDY: NORMAL
SPECIMEN DRAWN SERPL: NORMAL
U ESTRIOL MOM SERPL: 0.94
U ESTRIOL SERPL-MCNC: 2.28 NG/ML

## 2021-01-07 NOTE — PROGRESS NOTES
Preceptor Attestation:    Patient seen and evaluated in person. I discussed the patient with the resident. I have verified the content of the note, which accurately reflects my assessment of the patient and the plan of care.   Supervising Physician:  Jean Paul Hart MD.

## 2021-01-18 PROBLEM — O46.92 VAGINAL BLEEDING IN PREGNANCY, SECOND TRIMESTER: Status: ACTIVE | Noted: 2020-12-28

## 2021-01-18 NOTE — TELEPHONE ENCOUNTER
"Pt states that she was 8 minutes late for her appt with PRASAD and so was not able to be seen.  She states that she was getting \"bad vibe\" from staff and didn't want to argue so just left without rescheduling.  Pt asked for assistance with rescheduling.     Asked pt if she has had any further vaginal bleeding and she states that she has not but believes that she lost her mucus plug 2 days ago.  She states that she is now having \"lots\" of mucus discharge but denies vaginal itching, burning with urination or other concerns.  She denies contractions but did feel pelvic pressure 2 days ago which has since resolved.  She reports good FM.    Asked about iron infusions and she states that when she drove by the building it looked scary since it is [facility] and she did not feel comfortable going in.  She states that she has been taking her prenatals and iron supplement daily and is wondering if Dr Klein could check the level again as she does not want to go for iron infusions?  Scheduled [facility] appt for Thursday, 1/21/21 at 1:30 PM with Dr Klein.  Discussed that if she has ANY contractions, vaginal bleeding or fluid leaking that she NEEDS to go to [facility] for evaluation as she is only 22w2d gestation.  Pt verbalized understanding.    Called [facility] and soonest appt available is Monday, 1/25/21 at 4:30 PM with Dr Cleary.    Called pt back and gave her info.  Pt verbalized understanding and denied any questions.  Routed note to Dr Klein./NG   "

## 2021-01-18 NOTE — RESULT ENCOUNTER NOTE
Please send letter to patient with lab results.    Dear Kendrick Jacobs,     The results from your genetic testings did not show an increased risk of neural tube defects, down syndrome, or another genetic disorder call trisomy 18. We can talk more about thi at your next visit if you have quesitons. Your hemoglobin is still low, which we already talked about and set up the iron infusion appointments to help increase. Your B12 level is normal which is good. I look forward to seeing you at your next visit.    Thank you for allowing me to be a part of your health care!    Sincerely,   Mira Klein MD  1/18/2021

## 2021-01-21 ENCOUNTER — TELEPHONE (OUTPATIENT)
Dept: FAMILY MEDICINE | Facility: CLINIC | Age: 36
End: 2021-01-21

## 2021-01-22 PROBLEM — O34.32: Status: ACTIVE | Noted: 2020-12-28

## 2021-01-22 PROBLEM — O09.40 ANEMIA AFFECTING EIGHTH PREGNANCY: Status: ACTIVE | Noted: 2020-12-28

## 2021-01-22 PROBLEM — O26.879 CERVIX, SHORT (AFFECTING PREGNANCY): Status: ACTIVE | Noted: 2020-12-28

## 2021-01-22 PROBLEM — O99.019 ANEMIA AFFECTING EIGHTH PREGNANCY: Status: ACTIVE | Noted: 2020-12-28

## 2021-01-22 PROBLEM — D64.9 SEVERE ANEMIA: Status: ACTIVE | Noted: 2020-12-18

## 2021-01-22 NOTE — TELEPHONE ENCOUNTER
[facility] Answering Service Page:    Paged at 6:05 pm on 1/21/2021 regarding vaginal bleeding with pregnancy.    Patient is 22w5d pregnant by LMP. Has known vascularization around cervix per chart review and per US from 12/28 Cervical Length is 1.5-2.8 cm with some funneling of the cervix with some prominent vascularity near the internal cervical os. She does have a referral to OBGYN but has not made either appointment.     Today is concerned because she has had increased amounts of bleeding, almost like a beginning or end or period amount without large clot. This is more than her past bleeding which is concerning. Baby is moving. She also passed her mucous plug a few days ago without gross fluid loss. Does have ongoing mucous like discharge. Over the past few days has felt a lot more pelvic pressure. No contractions but endorses sometimes having quick labors with her prior 7 children, all of which were born at term.    She was supposed to see Dr. Klein today but had  issues and was unable to make her appointment. She also does have OBGYN appointment on Monday.     We discussed that the etiology of bleeding in pregnancy at length. No known previa upon last US, may be physiologic change of the vascular cervix as well. However, due to this increased amount of bleeding and short cervix with poor follow up in clinic I do think it is important she has evaluation to make sure this is not an early labor or distress to baby.     As she is only 22w5d I recommended she present to [facility] for evaluation in the ED and likely imaging. This may be all due to vascular change but I am concerned with the short cervix and increased pressure and quick labors in past. She inquired about a clinic appointment tomorrow but I reiterated we do not have the tools to look and measure her cervix or image the baby which I believe is important.    She is agreeable to present to [place]. Will forward this message on to LEANNA Parks and  Dr. Klein.    Fish Yepez MD

## 2021-01-22 NOTE — TELEPHONE ENCOUNTER
Pt called to be sure that we were aware that she was admitted to [facility].  She states that she was told that she will stay inpatient there until she delivers.  She is hopeful that baby will stay in for at least a couple more weeks.  She states that she is doing okay right now given the circumstances.  Told her to call if she needs anything.  Let her know that Dr Klein is aware that she was admitted to [facility].  Routed note to Dr Klein./JOVAN

## 2021-01-27 VITALS
RESPIRATION RATE: 16 BRPM | SYSTOLIC BLOOD PRESSURE: 104 MMHG | WEIGHT: 136.6 LBS | HEART RATE: 84 BPM | TEMPERATURE: 98 F | BODY MASS INDEX: 28.3 KG/M2 | DIASTOLIC BLOOD PRESSURE: 69 MMHG

## 2021-01-27 PROBLEM — O09.40 ANEMIA AFFECTING EIGHTH PREGNANCY: Status: RESOLVED | Noted: 2020-12-28 | Resolved: 2021-01-27

## 2021-01-27 PROBLEM — O34.32: Status: RESOLVED | Noted: 2020-12-28 | Resolved: 2021-01-27

## 2021-01-27 PROBLEM — O09.40 GRAND MULTIPARITY WITH CURRENT PREGNANCY: Status: RESOLVED | Noted: 2020-12-28 | Resolved: 2021-01-27

## 2021-01-27 PROBLEM — O99.019 ANEMIA AFFECTING EIGHTH PREGNANCY: Status: RESOLVED | Noted: 2020-12-28 | Resolved: 2021-01-27

## 2021-01-27 PROBLEM — O26.879 CERVIX, SHORT (AFFECTING PREGNANCY): Status: RESOLVED | Noted: 2020-12-28 | Resolved: 2021-01-27

## 2021-01-27 PROBLEM — O46.92 VAGINAL BLEEDING IN PREGNANCY, SECOND TRIMESTER: Status: RESOLVED | Noted: 2020-12-28 | Resolved: 2021-01-27

## 2021-01-27 PROBLEM — O09.90 SUPERVISION OF HIGH RISK PREGNANCY, ANTEPARTUM: Status: RESOLVED | Noted: 2020-12-28 | Resolved: 2021-01-27

## 2021-02-01 ENCOUNTER — TELEPHONE (OUTPATIENT)
Dept: FAMILY MEDICINE | Facility: CLINIC | Age: 36
End: 2021-02-01

## 2021-02-01 NOTE — TELEPHONE ENCOUNTER
ABOUT BABY:    1) Baby was born at 23 weeks gestation and is at [facility].  Mom states that he has a grade 3 and grade 4 brain bleed.  She states that the doctors asked them if they wanted to stop life support and she states that they do not want to yet.  She states that they will do another scan tomorrow.  Pt is wondering what Dr Klein thinks about the brain bleeds and whether she should try to get a second opinion?  Discussed writing down her questions as she thinks of them and bringing them with when speaking with neonatologist.          ABOUT MOM:    1) Any concerns about bleeding, stooling, urination, or abdominal pain? Vaginal bleeding has been minimal.  No abd pain. Incision is healing.     2) How is your mood and how are you coping? Mood is grey/confused/torn due to baby being in the hospital and uncertainty of if he will make it.  She states that she is not depressed or sad just hard that baby is not home.  She reports that she is coping/    3) What is your plan for contraception? Would like OCP and would like to start next week. Recommended a postpartum visit and appt scheduled for Monday, 2/8/21 at 1:30 PM with Dr Klein. Then we would be able to start contraception.  Reminded mom that she MUST abstain from intercourse or use condoms until this visit so she would be eligible for contraception.    Routed note to Dr Klein./JOVAN

## 2021-03-26 ENCOUNTER — AMBULATORY - HEALTHEAST (OUTPATIENT)
Dept: PHARMACY | Facility: HOSPITAL | Age: 36
End: 2021-03-26

## 2021-04-07 ENCOUNTER — AMBULATORY - HEALTHEAST (OUTPATIENT)
Dept: PHARMACY | Facility: HOSPITAL | Age: 36
End: 2021-04-07

## 2021-05-26 ENCOUNTER — RECORDS - HEALTHEAST (OUTPATIENT)
Dept: ADMINISTRATIVE | Facility: CLINIC | Age: 36
End: 2021-05-26

## 2021-05-28 NOTE — TELEPHONE ENCOUNTER
Pt called in states the has bloody mucus discharge is coming form her vagina.  The discharge has odor.  The discharge started today in the morning.  No rash.  No abdominal pain.  Pt states she is over due.  No fever, no itching, no blooding.  No pain with urination.  FAMILIA was 4/28/2019.  The disposition is go to ER.  Care advice given per protocol.  Patient agrees with care advice given.   Agreed to call back if he has additional symptoms or questions.      Yahir Lamb RN, [facility] Triage/Med Refill 4/30/2019 11:06 PM      Reason for Disposition    [1] Leakage of fluid from vagina AND [2] green or brown in color    Protocols used: PREGNANCY - LABOR-A-AH

## 2021-05-28 NOTE — ANESTHESIA PROCEDURE NOTES
Epidural Block    Patient location during procedure: OB  Time Called: 5/2/2019 8:47 AM  Reason for Block:labor epidural  Staffing:  Performing  Anesthesiologist: Fred Castro MD  Preanesthetic Checklist  Completed: patient identified, risks, benefits, and alternatives discussed, timeout performed, consent obtained, at patient's request, airway assessed, oxygen available, suction available, emergency drugs available and hand hygiene performed  Procedure  Patient position: sitting  Prep: ChloraPrep  Patient monitoring: continuous pulse oximetry, heart rate and blood pressure  Approach: midline  Location: L3-L4  Injection technique: ALMA air  Number of Attempts:1  Needle  Needle type: Tuohy   Needle gauge: 18 G     Catheter in Space: 4  Assessment  Sensory level:  No complications

## 2021-05-28 NOTE — PROGRESS NOTES
Pt presented to Triage with C/O increased discharge with an unusual odor. VS WNL. Pt placed on EFM and category 1 strip noted with no noted UC. UA, ROM+ and wet prep collected. UA shows possible UTI, Wet prep was positive for Clue Cells and Rom+ was negative. Alisa Lamar MD (resident) notified and will consult with attending. Orders received for treatment and discharge.

## 2021-05-28 NOTE — TELEPHONE ENCOUNTER
"Thinks water broke     Went to use bathroom and then after a \"squash of water that was bloody liquid\" came out.     Just pink fluid, no large blood    Light contractions since yesterday, was seen at L&D and sent home    7th child, FAMILIA 4/28/19    No contractions at the moment    Warm transfer to L&D [place]. Patient will shower then head to L&D.     Reason for Disposition    Leakage of fluid from vagina    Protocols used: PREGNANCY - RUPTURE OF [id number]-T-KR    Anne Ambrosio RN Triage Nurse Advisor [facility]    "

## 2021-05-28 NOTE — PROGRESS NOTES
"[facility] OB Triage    Subjective: Kendrick Jacobs is a  33 y.o. female at 40w3d with a current prenatal history significant for iron deficiency of pregnancy and abnormal pregnancy ultrasound who presents to OB triage with vaginal discharge. She reports that she was seen in clinic on 19, and had her membranes stripped along with cervical examination. She reports that shortly after the clinic visit, she noticed increased pinkish, creamy discharge that was she describes as relatable to \"a mucous plug.\" She then continued to have increased discharge which included blood, and changed to greenish/yellow discharge. She then came in to triage tonight because her discharge has become malodorous. She reports that she has been having irregular, intermittent contractions that have not increased over the past few weeks. She has developed lower back pain over the past few weeks as well. She is scheduled for induction on 19. She denies abdominal pain, dysuria, changes in urination, headache, vision changes, increased swelling, chest pain or dyspnea. Fetal Movement: normal.    Estimated Date of Delivery: 19 No LMP recorded. Patient is pregnant.  OB provider: Mira Klein MD  OB History        7    Para   6    Term   6            AB        Living   6       SAB        TAB        Ectopic        Multiple        Live Births   6                 Objective:   Blood pressure 120/72, pulse 67, temperature 98.6  F (37  C), temperature source Oral, resp. rate 16, SpO2 99 %.  General:   alert, appears stated age and cooperative   Skin:   normal   HEENT:  sclera clear, anicteric   Lungs:   clear to auscultation bilaterally   Heart:   regular rate and rhythm, S1, S2 normal, no murmur, click, rub or gallop   Extremities: Warm, dry and well perfused. No edema.   Neuro: Reflexes 2+ and symmetric.    Abdomen: FHT present   Membranes intact   [place]:  None   FHT: Baseline: 134 bpm, Variability: Moderate " (6 - 25 bpm), Accelerations: Present, Decelerations: Absent; Category I Tracing   Back: No CVA tenderness                             Laboratory Studies:   Results for orders placed or performed during the hospital encounter of 19   Collect and send wet prep vaginal specimen as indicated by prenatal history and/or patient complaints   Result Value Ref Range    Yeast Result No yeast seen No yeast seen    Trichomonas No Trichomonas seen No Trichomonas seen    Clue Cells, Wet Prep Clue cells seen (!) No Clue cells seen   Rupture of Membrane Test or Screen   Result Value Ref Range    Rupture Fetal Membrane Negative Negative   Urinalysis-UC if Indicated   Result Value Ref Range    Color, UA Yellow Colorless, Yellow, Straw, Light Yellow    Clarity, UA Clear Clear    Glucose, UA Negative Negative    Bilirubin, UA Negative Negative    Ketones, UA Negative Negative    Specific Gravity, UA 1.020 1.001 - 1.030    Blood, UA Negative Negative    pH, UA 6.0 4.5 - 8.0    Protein, UA 30 mg/dL (!) Negative mg/dL    Urobilinogen, UA <2.0 E.U./dL <2.0 E.U./dL, 2.0 E.U./dL    Nitrite, UA Negative Negative    Leukocytes, UA Large (!) Negative    Bacteria, UA Few (!) None Seen hpf    RBC, UA 0-2 None Seen, 0-2 hpf    WBC, UA 25-50 (!) None Seen, 0-5 hpf    Squam Epithel, UA >100 (!) None Seen, 0-5 lpf    Mucus, UA Few (!) None Seen lpf        ASSESSMENT AND PLAN: Kendrick Jacobs is a  33 y.o. female who presented to [facility] at 40w3d on 2019 with increased, malodorous vaginal discharge likely secondary to bacterial vaginosis seen on wet prep as well as acute cystitis as seen on UA. FHT category I tracing. ROM+ was negative which is reassuring that her membranes are not ruptured at this time. Deferred cervical exam at this time due to no active contractions seen on monitor and in setting of infection. She is scheduled to be induced on 19. Will treat accordingly for both infections to help reduce the  risk of further complications.     1. Bacterial vaginosis  - metroNIDAZOLE (FLAGYL) 500 MG tablet; Take 1 tablet (500 mg total) by mouth 2 (two) times a day for 7 days.  Dispense: 14 tablet; Refill: 0  2. Acute cystitis without hematuria  Denies dysuria. Has been having lower back pain. Will treat to reduce risk of ascending infection. No signs of fever and no CVA tenderness on exam makes pyelonephritis less likely.   - cephalexin (KEFLEX) 500 MG capsule; Take 2 capsules (1,000 mg total) by mouth 2 (two) times a day for 7 days.  Dispense: 28 capsule; Refill: 0  3. Scheduled for induction on 5/5/19. Instructed patient to come in on that day for induction. Discussed signs and symptoms to return including: increased frequency/intensity of contractions, increased vaginal bleeding, large loss of fluid, etc.        Patient discussed with attending physician, Dr.Darin Reece, who agrees with the plan.      Alisa Lamar MD PGY1 5/1/2019  [facility]

## 2021-05-28 NOTE — PROGRESS NOTES
"[facility] OB Triage    Subjective: Kendrick Jacobs is a  33 y.o. female at 40w3d with a current prenatal history significant for iron deficiency of pregnancy and abnormal pregnancy ultrasound who presents to OB triage with increased contractions. She was recently seen in triage for vaginal discharge and diagnosed with bacterial vaginosis and a UTI. She reports that this morning around 0500hrs, she began having contractions every 15 minutes. During this time, she has not had any changes in vaginal discharge, no vaginal bleeding, no leakage of fluid. She is feeling pain/tightening diffusely in her abdomen only with no radiation to her back.     Estimated Date of Delivery: 19 No LMP recorded. Patient is pregnant.  OB provider: Mira Klein MD  OB History        7    Para   6    Term   6            AB        Living   6       SAB        TAB        Ectopic        Multiple        Live Births   6                 Objective:   Blood pressure 118/75, pulse 86, temperature 97.9  F (36.6  C), temperature source Oral, resp. rate 16, height 4' 11\" (1.499 m), weight 160 lb (72.6 kg).   Objective:   General:   alert, appears stated age and cooperative   Skin:   normal   HEENT:  sclera clear, anicteric   Lungs:   clear to auscultation bilaterally   Heart:   regular rate and rhythm, S1, S2 normal, no murmur, click, rub or gallop   Extremities: Warm, dry and well perfused. No edema.   Neuro: Reflexes 2+ and symmetric.    Abdomen: FHT present   Membranes intact   [place]:  None   FHT: Baseline: 121 bpm, Variability: Moderate (6 - 25 bpm), Accelerations: Present, Decelerations: Absent; Category I Tracing   Back: No CVA tenderness   Cervical Exam: 2cm/50%/-3, firm.        Laboratory Studies: No new labs     ASSESSMENT AND PLAN: Kendrick Jacobs is a  33 y.o. female who presented to [facility] at 40w3d on 2019 with increased contractions every 15 minutes. Exam here reveals no " cervical change from prior clinic visit on 4/29/19 and an inadequate contraction pattern. She is currently not in active phase labor, likely starting to have prodromal labor or having exacerbation of contractions from her bacterial vaginosis and UTI.   1. Not in labor.  2. Continue to take metronidazole and keflex as prescribed. Received one dose of each during previous visit  3. Scheduled for induction on 5/5/19. Instructed patient to come in on that day for induction. Discussed signs and symptoms to return including: increased frequency/intensity of contractions, increased vaginal bleeding, large loss of fluid, etc.     Patient discussed with attending physician, Dr.Darin Reece, who agrees with the plan.      Alisa Lamar MD PGY1 5/1/2019  [facility]

## 2021-05-28 NOTE — ANESTHESIA POSTPROCEDURE EVALUATION
Patient: Kendrick Jacobs  * No procedures listed *  Anesthesia type: epidural    Patient location: Labor and Delivery  Last vitals: No vitals data found for the desired time range.    Post vital signs: stable  Level of consciousness: awake and responds to simple questions  Post-anesthesia pain: pain controlled  Post-anesthesia nausea and vomiting: no  Pulmonary: unassisted, return to baseline  Cardiovascular: stable and blood pressure at baseline  Hydration: adequate  Anesthetic events: no    QCDR Measures:  ASA# 11 - Elli-op Cardiac Arrest: ASA11B - Patient did NOT experience unanticipated cardiac arrest  ASA# 12 - Elli-op Mortality Rate: ASA12B - Patient did NOT die  ASA# 13 - PACU Re-Intubation Rate: NA - No ETT / LMA used for case  ASA# 10 - Composite Anes Safety: ASA10A - No serious adverse event    Additional Notes:  Minimal relief from EDC 2/2 recent placement and ultimate delivery within 20 min

## 2021-05-30 ENCOUNTER — RECORDS - HEALTHEAST (OUTPATIENT)
Dept: ADMINISTRATIVE | Facility: CLINIC | Age: 36
End: 2021-05-30

## 2021-05-31 ENCOUNTER — RECORDS - HEALTHEAST (OUTPATIENT)
Dept: ADMINISTRATIVE | Facility: CLINIC | Age: 36
End: 2021-05-31

## 2021-06-03 VITALS — HEIGHT: 59 IN | WEIGHT: 160 LBS | BODY MASS INDEX: 32.25 KG/M2

## 2021-10-19 NOTE — PROGRESS NOTES
10/25/21      Boo Villanueva   [address]  [place] [address]       Dear Boo Villanueva    Your are schedule for a COVID 19 test on 12/6/21 at 1100am. The address is [address] [place] [address], [facility].  Please arrive the  [facility].  Dr Miller has ordered this for you to have your procedure.    COVID-19 Testing - Patient Instructions  • If you test positive, you will be notified by your surgeon/proceduralist prior to your surgery/procedure who will consider rescheduling or delaying when able; otherwise, the negative result will be relayed to you on the day of your procedure  • If you start to experience any of the below symptoms after you receive your COVID-19 test and prior to your procedure/surgery, you are required to call the physician's clinic to determine if procedure/surgery will still occur or be completed  Temperature:  Fever > 100.4 over the last 4 days,  Respiratory Symptoms:  new or worsening cough, shortness of breath, sore  throat  GI Symptoms:  New onset of nausea, vomiting or diarrhea  Have you or your household member tested positive in the last 14 days  Any Chills/ repeated shaking with chills/muscle pain or headache  New onset of loss of taste or smell  • Please stay home, expectation is that you isolate (remain at home) from your COVID-19 testing to your procedure/surgery   • If you are unable to attend your COVID-19 testing appointment on the date listed above, you are required to call your physician to reschedule      Tips for Staying Healthy  • Please stay home and avoid close contact with people who are sick, yet please know that people may not show obvious symptoms  • Stay home when you are sick, except to get medical care  • Hand hygiene:  Wash hands often with soap and water for at least 20 seconds especially after being in a public place, after blowing their nose, coughing or sneezing.  If soap and water are not readily available, use a  "Preceptor attestation:  Vital signs reviewed: BP 99/66   Pulse 112   Temp 98  F (36.7  C)   Resp 16   Ht 1.486 m (4' 10.5\")   Wt 71.4 kg (157 lb 6.4 oz)   LMP 07/12/2018 (Approximate)   BMI 32.34 kg/m      Patient seen, evaluated, and discussed with the resident.  I have verified the content of the note, which accurately reflects my assessment of the patient and the plan of care.    Supervising physician: Anat Ferro MD  [facility]  " hand  that contains at least 60% alcohol - cover all surfaces of your hands and rub them together until they feel dry  • Avoid touching eyes, nose and mouth with unwashed hands  • Cough or sneeze inside your elbow if possible  • Keep a minimum of 6 feet between yourself and others   • Wear a mask when you go out in public to protect other people in case you are infected.  Masks are not a substitute for social distancing.  Cloth masks should not be placed on young children under age 2, anyone who has trouble breathing, is unconscious, incapacitated or otherwise unable to remove the mask without assistance  • Clean and disinfect frequently touched surfaces daily.  This includes tables, doorknobs, light switches, countertops, handles, desks, phones, keyboards, toilets, faucets and sinks     You are scheduled for a Colonoscopy on 12/8/21.  Arrival time: 1200  Location: [facility]      The following instructions are very important.  Please read the entire packet thoroughly and follow the instructions as outlined.  Please be aware that emergencies do occur which may cause changes in the timing of your procedure.  Every attempt will be made to keep you informed of changes as they occur.  We appreciate your cooperation with this; please do not hesitate to let us know what can be done to make your stay as pleasant as possible while you wait.  • Please Note: You cannot drive yourself home after the procedure.  • A responsible adult (over 18 years old) must drive you home and stay with you overnight.  • If you do not have a ride home and or someone to stay with you overnight, you procedure will delayed or canceled.   • You cannot take public transportation home from your procedure.  You will be at the Surgery Center 2 ½ to 3 hours from your arrival time to discharge.  **Please be courteous to our staff and to other patients: if you must cancel this procedure, please do so as early as possible and 3  business days before your scheduled procedure.  A cancellation within 48 hours prior to the procedure will be considered a no show.  Colonoscopy Bowel Prep Instructions  • The bowel must be adequately cleansed for proper visualization during the procedure.  Follow the instructions carefully to avoid having to reschedule your procedure  • Please purchase the following items for you prep:  o One (1) bottle of Magnesium Citrate (10-ounce size) and any flavor that is clear  o One (1) 239 gram (8.3 ounce) bottle of Miralax  o 64 ounces of any one of these: Gatorade, Propel Water or Powerade - avoid red, dark blue or purple colored  o One (1) 12 ounce can of 7-up  o Four (4) Dulcolax Laxative 5mg tablets  • You do not need a prescription for these products.  They can be purchased at any pharmacy over the counter.  7 days before your procedure   • Arrange for someone to drive you to/from your procedure and stay with you overnight.  • If you take blood thinners, contact the prescribing physician to make sure that you can stop taking them 5 days prior to your procedure as directed.  • Contact our office if you cannot stop these medications  • If you are a diabetic patient: please call the doctor that manages your diabetic medications and let them know that you are prepping for a colonoscopy.  They will advise you on how to adjust your medications during the prep.  5 days before your procedure 12/3/21  • Stop anti-coagylants/blood thinners  • Stop Aspirin and all aspirin containing products  • Stop all NSAIDS (NSAIDS include ibuprofen, advil, motrin, Naprosyn, aleve, Celebrex, Indocin and piroxicam) You may take Tylenol products   • Stop all herbal supplements, iron supplements and fish oil (You may take multivitamin)  3 days before your procedure 12/5/21  • Stop eating high fiber foods (high fiber foods Include salads, seeds, nuts, whole wheat breads and popcorn)  • Stop taking fiber supplements  • Stop taking anti-diarrheal  medications  • Start eating a low residue diet (this includes well cooked meats/fish, eggs, white bread/pasta/rice, potato without skins, canned or well-cooked fruits and vegetables)  2 days before your procedure 12/6/21  • Eat low residue foods during the day (this includes well cooked meats/fish, eggs, white bread/pasta/rice, potato without skins, canned or well-cooked fruits and vegetables)   • This dinner will be your last solid food before the procedure.  Eat a small meal.  1 day before your procedure 12/7/21  • The morning before your procedure, Start drinking CLEAR LIQUIDS ONLY.  Drink 8 ounces of clear liquids every hour while you are awake today  • At 8 a.m. take two (2) Dulcolax Laxative 5mg tablets on 12/7/21        At 9 a.m. take two (2) Dulcolax Laxative 5mg tablets on 12/7/21  • See the following list of clear liquids.  Do not drink smoothies, shakes or milk.  Avoid drinks that are colored red, blue or purple  • 12:00 NOON Mix the entire 238-gram bottle of Miralax in 64-ounce Gatorade, Propel or Powerade.  Drink an 8-ounce glass of this liquid every 30 to 45 minutes until the entire solution is gone.  (Continue drinking clear liquids thru the day as much as you can tolerate.  This will aid in keeping you hydrated. See clear liquid list below)  • 600 PM Mix Magnesium citrate and 7-up and drink (slowly) until gone    Day of your procedure 12/8/21  • At 430 am Take Heart, blood pressure, seizure and respiratory medications and inhalers by 700 am.  No blood thinners or oral diabetic medication.  (Hold all other medications until after your procedure)    NOTHING BY MOUTH 4 HOURS (8:00am) PRIOR TO YOU SCHEDULED PROCEDURE    • Bring your insurance cards, glasses and case, hearing aids, and a list of current medications with you.  • Leave all unnecessary jewelry, money, or other valuables at home.  • Remove all piercings before coming to the surgery center  • Wear or bring loose, comfortable clothing to go  home in  • Females still menstruating will be aske for a urine sample.  • You or your family members may wish to bring reading material, crafts or other items to occupy the waiting time.    Clear Liquids  • Water  • Apple Juice, White grape juice, White Cranberry Juice, Non -citrus juices without pulp (no Orange juice)  • Ginger ale, diet or regular 7-up, sprite, Angelita  • Clear Broth (such as Bouillon Cubes)  No jello, beef or chicken broth  • Popsicles, hard candies  • Black coffee or tea with out cream or powdered creamer  ** Do not drink or consume anything that is red, blue or purple in color  ** Do not drink or consume any milk, diary products, orange juice or tomato juice  Insurance Authorization  Our referral department will contact your insurance company for prior authorization only.  The authorization DOES NOT determine your benefits for the procedure.  We strongly encourage you to call your insurance company and ask about your benefits for both screening and diagnostic colonoscopy to determine your out-of-pocket expense.  CALL: [phone] with Authorization questions  CALL: [phone] to [employer]    You are Scheduled for a Colonoscopy with a diagnosis of History of polyps    Our referral department will contact your insurance company for prior authorization only  What is a colonoscopy?  Your primary care doctor has recommended a colonoscopy. Your doctor determines if the test is scheduled as a Screening or a Diagnostic Colonoscopy. He/She may order a screening test, however, during the procedure Dr. Miller may have to change the procedure to a diagnostic colonoscopy because of his findings.    Your Colonoscopy Results  • You will receive a letter from Dr. Miller after the Colonoscopy with your results and recommendation if a follow up is needed. If you have a biopsy, it may take up to 7-10 business days for you to receive your letter. PLEASE CONTINUE TO READ FOR AN EXPLANATION  OF POLYPS/COLONOSCOPY RESULTS AND SAVE THIS PACKET SO YOU WILL UNDERSTAND THE LETTER WHEN YOU RECEIVE IT.    COLON POLYPS are growths in the colon or rectum. The cause of most polyps is not known. Most polyps do not cause symptoms, but large polyps are more likely than small polyps to cause symptoms such as rectal bleeding or pain. Polyps or a sample of polyp tissue (called a biopsy) can be removed during the colonoscopy. The tissue is examined by a pathologist to determine if it is the kind that could become cancer.    HYPERPLASTIC POLYPS are a type of non-cancerous (benign) growth found in the colon. They are usually small. Hyperplastic polyps do not develop into cancer.    ADENOMATOUS POLYPS (pronounced add-in-oh-mat-us) are a type of abnormal growth in the colon. While most colon polyps do not cause any problems, adenomatous polyps are thought to be the source of most colorectal cancer. Adenomatous polyps usually grow very slowly, and it may be years before they turn into cancer, if they ever do. They usually are discovered during a routine colonoscopy and are removed. The discovery of adenomatous polyps in your colon means that you need to be screened for colorectal cancer more often than the average person.     To help you prepare for your upcoming procedure, you will be sent an Internet Educational Program called PHIL. Instructions will be sent to you via email or phone from [facility] [place]    Colon Cancer is the second leading cause of death among cancers, per the American Cancer Society.  It is preventable.  Early detection is the key.  Your doctor will determine which tests need to be done for prevention and/or treatment.  However, colonoscopies can be performed for several reasons:    • Screening- To screen for any problems with in the colon.  In this case, the patient is not symptomatic and does not have a personal history of previous colon or abnormal findings. Billed as  screening.  • Surveillance - To monitor for a previously diagnosed colon condition (such as polyps) or when performed at more frequent intervals than every ten (10) years because the patient has a personal/family history of colonic polyps or colon cancer.  Billed as diagnostic.  • Diagnostic - Patient with symptoms, used to evaluate the colon.  Billed as diagnostic.    If during the course of a screening colonoscopy, our physician finds an abnormality; performs a biopsy or polypectomy (removal of polyp), your insurance company may consider the procedure to be a diagnostic exam and no longer a screening procedure.      Thank you for choosing [facility] [place] [facility] [place] for your Colonoscopy.       Sincerely,       Dr Donaldo Miller  [facility] [facility]  [address]  [place] [address]  Phone: [phone]

## 2022-04-08 ENCOUNTER — TELEPHONE (OUTPATIENT)
Dept: FAMILY MEDICINE | Facility: CLINIC | Age: 37
End: 2022-04-08
Payer: COMMERCIAL

## 2022-04-08 DIAGNOSIS — Z59.9 FINANCIAL DIFFICULTIES: Primary | ICD-10-CM

## 2022-04-08 SDOH — ECONOMIC STABILITY - INCOME SECURITY: PROBLEM RELATED TO HOUSING AND ECONOMIC CIRCUMSTANCES, UNSPECIFIED: Z59.9

## 2022-05-12 NOTE — TELEPHONE ENCOUNTER
Called patient back, no answer and mailbox is full so unable to leave message. ./LR  
Discussed with Karla and she states that the clinic is currently holding referrals until the new  is hired.    [facility] pt [facility]NG  
[facility] phone call message- general phone call:    Reason for call: She is returning a call to Kasey.    Action desired: call back.    Return call needed: Yes    OK to leave a message on voice mail? Yes    Advised patient to response may take up to 2 business days: Yes    Primary language: English      needed? No    Call taken on April 8, 2022 at 1:37 PM by Kristen Ordaz  
May 11, 2022   Legal Services Referral - [place] only  Legal referral has been sent to Ashwin Kulkarni from [facility].     Scan/Send demographics and referral to [email or web address]    Ashwin will review, advise, and contact the patient.     Karla Gonzalez     
Pt states that a few months ago she found out that another woman has been using her identity. She states that someone applied for cash assistance and food support in her account and used over $1,000.  She states that she told the county that it was not her and they are going to be reinstating food support.  She states that her son July Gonzalez 9/4/08 is getting social security $900 per month but it was cut down to $500 per month because Social Security says that she owes $9,000.  She states that this person has also opened up a lot of credit cards in her name.  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  She states that when investigating she believes that this former bank employee was taking money out of her account over a 2 year period for more than $20,000.  She states that she let the bank know and was told that they would investigate but has not heard anything more. Asked if she has an  that is helping her with this and she does not.  Offered referral to [facility] and pt accepted.  Placed referral and told her that someone should contact her soon and told her to call back if she needs any help with food or other needs while this is being sorted out./NG  
Pt states that she has left messages at [facility] and no one has called her back.  Told her the new  has been hired to work at [facility] so will send referral./JOVAN  
62.4

## 2022-05-13 NOTE — PROGRESS NOTES
"S: Kendrick Jacobs is a 33 year old female with a PMH of   Patient Active Problem List   Diagnosis     Need for prophylactic vaccination with measles-mumps-rubella (MMR) vaccine     Chronic low back pain     Anemia due to blood loss, acute     presenting to clinic today with a chief complaint of vaginal bleeding last week and large clot. Today 2 large clots after getting kids off to school. Reported blood was bright red. She reported feeling fullness and \"pushed\" and another blood clot came out. She reports after clot episodes some pink when she wipes. . Youngest is 9 years old. No pain currently.    She denies dizziness or lightheadedness. No fatigue. She reports decreased appetite.       ROS:  General: No fevers. Report chills for a month or more  CV: No chest pain or palpitations.  Resp: No shortness of breath.  No cough.  GI: No nausea, vomiting. Reports constipation, no diarrhea  : No urinary pains    Current Outpatient Prescriptions   Medication Sig Dispense Refill     ferrous sulfate (IRON) 325 (65 Fe) MG tablet Take 1 tablet (325 mg) by mouth daily (with breakfast) 30 tablet 2     Prenatal Vit-Fe Fumarate-FA (PRENATAL VITAMIN) 27-0.8 MG TABS Take 1 tablet by mouth daily 100 tablet 3       O: BP 93/61  Pulse 71  Temp 98  F (36.7  C) (Oral)  Resp 16  Wt 132 lb (59.9 kg)  LMP 2018 (Approximate)  SpO2 99%  BMI 27.59 kg/m2   Gen:  Well nourished and in No acute distress   CV:  RRR  - no murmurs noted   Pulm:  CTAB, no wheezes or crackles noted, good air entry   ABD: soft, nontender, no masses, no rebound. Bedside ultrasound demonstrating live fetus with heartbeat and moving limbs.   : declined by patient.   Extrem: no cyanosis, edema or clubbing  Psych: Euthymic      Assessment and Plan:  Kendrick was seen today for vaginal bleeding.    Diagnoses and all orders for this visit:    Vaginal bleeding  -     CBC with Diff Plt ([facility])  -     US OB <14 WKS SINGLE OR FIRST GESTATION; Future  Comment: " recommend recheck in 1 weeks time. Recent bleed with clots and US performed with results indicating imaging could not rule out subchorionic hemorrhage and fibroid. Discussed that there is a chance of loss of pregnancy due to bleeding as this could be signs of a miscarriage. Patient is Rh+ and not requiring rhogam.     Will recheck prior to first OB appointment so imaging is available. Due to bleeding, CBC checked. Discussed results over the phone with patient. Iron supplement as below. Patient reported understanding.       Need for vaccination  -     ADMIN VACCINE, INITIAL  -     FLU VAC QUADRIVLENT SPLIT VIRUS IM 0.5ml dosage    Anemia due to blood loss, acute  -     ferrous sulfate (IRON) 325 (65 Fe) MG tablet; Take 1 tablet (325 mg) by mouth daily (with breakfast)  Comment: advised patient she may increase to BID dosing if she tolerates in addition to continuing prenatal vitamin.       This patient was seen and discussed with Dr. Jeffrey who agrees with the assessment and plan.     Merissa Perales, DO  PGY3     FAMILY HISTORY:  No pertinent family history in first degree relatives FAMILY HISTORY:  No pertinent family history in first degree relatives

## 2023-01-01 NOTE — TELEPHONE ENCOUNTER
Agreed and noted - D Power, covering for Dr Ferro  
Pt states that she had to reschedule her NOB to 11/8/18.  She is wondering about her quant beta hcg result?  Gave her results and told her that she needs to have another test today to make sure level is not decreasing.  She states that she will come in today at 2:00 PM for lab only appt.  She states that the spotting stopped the next day after it started.  Told her that OB ultrasound was all normal.  Pt verbalized understanding and had no further questions./NG  
SCM

## 2023-03-06 NOTE — ANESTHESIA PREPROCEDURE EVALUATION
Anesthesia Evaluation      Patient summary reviewed   No history of anesthetic complications     Airway    Pulmonary - negative ROS                          Cardiovascular - negative ROS   Neuro/Psych - negative ROS     Endo/Other - negative ROS   (+) pregnant     GI/Hepatic/Renal    (+) GERD,             Dental                         Anesthesia Plan  Planned anesthetic: epidural    ASA 2   Induction: intravenous   Anesthetic plan and risks discussed with: patient    Post-op plan: epidural analgesia and routine recovery           Family

## 2023-03-08 ENCOUNTER — OFFICE VISIT (OUTPATIENT)
Dept: FAMILY MEDICINE | Facility: CLINIC | Age: 38
End: 2023-03-08
Payer: COMMERCIAL

## 2023-03-08 VITALS
SYSTOLIC BLOOD PRESSURE: 108 MMHG | DIASTOLIC BLOOD PRESSURE: 77 MMHG | TEMPERATURE: 98.2 F | WEIGHT: 130.4 LBS | BODY MASS INDEX: 27.37 KG/M2 | HEART RATE: 73 BPM | OXYGEN SATURATION: 99 % | HEIGHT: 58 IN | RESPIRATION RATE: 16 BRPM

## 2023-03-08 DIAGNOSIS — N94.6 DYSMENORRHEA: ICD-10-CM

## 2023-03-08 DIAGNOSIS — Z11.59 NEED FOR HEPATITIS C SCREENING TEST: ICD-10-CM

## 2023-03-08 DIAGNOSIS — Z12.4 CERVICAL CANCER SCREENING: ICD-10-CM

## 2023-03-08 DIAGNOSIS — N92.0 MENORRHAGIA WITH REGULAR CYCLE: ICD-10-CM

## 2023-03-08 DIAGNOSIS — D62 ANEMIA DUE TO BLOOD LOSS, ACUTE: ICD-10-CM

## 2023-03-08 DIAGNOSIS — Z00.01 ENCOUNTER FOR ROUTINE ADULT HEALTH EXAMINATION WITH ABNORMAL FINDINGS: Primary | ICD-10-CM

## 2023-03-08 DIAGNOSIS — M25.431 SWELLING OF RIGHT WRIST: ICD-10-CM

## 2023-03-08 LAB
ANION GAP SERPL CALCULATED.3IONS-SCNC: 10 MMOL/L (ref 7–15)
BUN SERPL-MCNC: 13.3 MG/DL (ref 6–20)
CALCIUM SERPL-MCNC: 9.1 MG/DL (ref 8.6–10)
CHLORIDE SERPL-SCNC: 107 MMOL/L (ref 98–107)
CHOLEST SERPL-MCNC: 186 MG/DL
CREAT SERPL-MCNC: 0.51 MG/DL (ref 0.51–0.95)
DEPRECATED HCO3 PLAS-SCNC: 26 MMOL/L (ref 22–29)
ERYTHROCYTE [DISTWIDTH] IN BLOOD BY AUTOMATED COUNT: 17.8 % (ref 10–15)
GFR SERPL CREATININE-BSD FRML MDRD: >90 ML/MIN/1.73M2
GLUCOSE SERPL-MCNC: 90 MG/DL (ref 70–99)
HCT VFR BLD AUTO: 28.7 % (ref 35–47)
HDLC SERPL-MCNC: 64 MG/DL
HGB BLD-MCNC: 8.1 G/DL (ref 11.7–15.7)
LDLC SERPL CALC-MCNC: 96 MG/DL
MCH RBC QN AUTO: 19.6 PG (ref 26.5–33)
MCHC RBC AUTO-ENTMCNC: 28.2 G/DL (ref 31.5–36.5)
MCV RBC AUTO: 70 FL (ref 78–100)
NONHDLC SERPL-MCNC: 122 MG/DL
PLATELET # BLD AUTO: 260 10E3/UL (ref 150–450)
POTASSIUM SERPL-SCNC: 4.3 MMOL/L (ref 3.4–5.3)
RBC # BLD AUTO: 4.13 10E6/UL (ref 3.8–5.2)
SODIUM SERPL-SCNC: 143 MMOL/L (ref 136–145)
TRIGL SERPL-MCNC: 131 MG/DL
WBC # BLD AUTO: 4.7 10E3/UL (ref 4–11)

## 2023-03-08 PROCEDURE — 80048 BASIC METABOLIC PNL TOTAL CA: CPT | Performed by: STUDENT IN AN ORGANIZED HEALTH CARE EDUCATION/TRAINING PROGRAM

## 2023-03-08 PROCEDURE — 86803 HEPATITIS C AB TEST: CPT | Performed by: STUDENT IN AN ORGANIZED HEALTH CARE EDUCATION/TRAINING PROGRAM

## 2023-03-08 PROCEDURE — 85027 COMPLETE CBC AUTOMATED: CPT | Performed by: STUDENT IN AN ORGANIZED HEALTH CARE EDUCATION/TRAINING PROGRAM

## 2023-03-08 PROCEDURE — 80061 LIPID PANEL: CPT | Performed by: STUDENT IN AN ORGANIZED HEALTH CARE EDUCATION/TRAINING PROGRAM

## 2023-03-08 PROCEDURE — 99395 PREV VISIT EST AGE 18-39: CPT | Mod: GC | Performed by: STUDENT IN AN ORGANIZED HEALTH CARE EDUCATION/TRAINING PROGRAM

## 2023-03-08 PROCEDURE — 99213 OFFICE O/P EST LOW 20 MIN: CPT | Mod: 25 | Performed by: STUDENT IN AN ORGANIZED HEALTH CARE EDUCATION/TRAINING PROGRAM

## 2023-03-08 PROCEDURE — 36415 COLL VENOUS BLD VENIPUNCTURE: CPT | Performed by: STUDENT IN AN ORGANIZED HEALTH CARE EDUCATION/TRAINING PROGRAM

## 2023-03-08 RX ORDER — IBUPROFEN 400 MG/1
400-800 TABLET, FILM COATED ORAL EVERY 8 HOURS PRN
Qty: 100 TABLET | Refills: 3 | Status: SHIPPED | OUTPATIENT
Start: 2023-03-08

## 2023-03-08 RX ORDER — ACETAMINOPHEN 500 MG
500-1000 TABLET ORAL EVERY 6 HOURS PRN
Qty: 100 TABLET | Refills: 3 | Status: SHIPPED | OUTPATIENT
Start: 2023-03-08

## 2023-03-08 RX ORDER — FERROUS SULFATE 325(65) MG
325 TABLET ORAL
Qty: 30 TABLET | Refills: 3 | Status: SHIPPED | OUTPATIENT
Start: 2023-03-08

## 2023-03-08 ASSESSMENT — ENCOUNTER SYMPTOMS
EYE PAIN: 0
HEMATURIA: 0
BREAST MASS: 0
COUGH: 0
FREQUENCY: 0
PARESTHESIAS: 0
CHILLS: 0
ARTHRALGIAS: 0
MYALGIAS: 0
HEMATOCHEZIA: 0
NAUSEA: 0
JOINT SWELLING: 1
HEARTBURN: 0
SHORTNESS OF BREATH: 0
HEADACHES: 0
FEVER: 0
PALPITATIONS: 0
WEAKNESS: 0
ABDOMINAL PAIN: 0
JOINT SWELLING: 0
SORE THROAT: 0
DYSURIA: 0
CONSTIPATION: 0
DIARRHEA: 0
DIZZINESS: 0
NERVOUS/ANXIOUS: 0

## 2023-03-08 NOTE — PATIENT INSTRUCTIONS
- schedule pap smear when you have availability   - please bring in immunization records from [place] next visit  - restart your iron pills. Okay to take it every day or every other day, absorption rates are the same however you'd take it. Take with your biggest meal of the day. Avoid taking within 1 hour of drinking milk. Best to take with sources of vitamin C.   - when you get bad cramp, take 600-800 mg of ibuprofen every 8 hours. Take with 500-1000 mg of tylenol every 8 hours.   - you will be called to schedule pelvic ultrasound and ultrasound of your wrist

## 2023-03-08 NOTE — PROGRESS NOTES
SUBJECTIVE:   CC: Kendrick is an 37 year old who presents for preventive health visit.   Patient has been advised of split billing requirements and indicates understanding: Yes  Healthy Habits:     Getting at least 3 servings of Calcium per day:  NO    Bi-annual eye exam:  NO    Dental care twice a year:  NO    Sleep apnea or symptoms of sleep apnea:  None    Diet:  Regular (no restrictions)    Frequency of exercise:  None    Taking medications regularly:  Yes    Medication side effects:  None    PHQ-2 Total Score: 0    Additional concerns today:  Yes    Patient has a bump on her R wrist that came a year ago. She tried hitting it with a heavy book to pop it and it didn't work. No pain. It has been the same size for many months.   No other concerns today.   She has 8 children.   LMP 1 month ago, she gets monthly cycles, did have twice monthly menstrual cycle for 3 months at one point but then it resolved. She reports heavy cramps with menstrual cycles since birth of her last child. She takes advil and this does help. She has heavy menstrual periods, required admissions in the past for anemia. She notes she is starting to crave more ice.     Today's PHQ-2 Score:   PHQ-2 ( 1999 Pfizer) 3/8/2023   Q1: Little interest or pleasure in doing things 0   Q2: Feeling down, depressed or hopeless 0   PHQ-2 Score 0   PHQ-2 Total Score (12-17 Years)- Positive if 3 or more points; Administer PHQ-A if positive -   Q1: Little interest or pleasure in doing things Not at all   Q2: Feeling down, depressed or hopeless Not at all   PHQ-2 Score 0     Have you ever done Advance Care Planning? (For example, a Health Directive, POLST, or a discussion with a medical provider or your loved ones about your wishes): No, advance care planning information given to patient to review.  Patient plans to discuss their wishes with loved ones or provider.      Social History     Tobacco Use     Smoking status: Never     Smokeless tobacco: Never    Substance Use Topics     Alcohol use: No     Alcohol Use 3/8/2023   Prescreen: >3 drinks/day or >7 drinks/week? No     Reviewed orders with patient.  Reviewed health maintenance and updated orders accordingly - Yes  Labs reviewed in EPIC    Breast Cancer Screening:    FHS-7: No flowsheet data found.  Patient under 40 years of age: Routine Mammogram Screening not recommended.   Pertinent mammograms are reviewed under the imaging tab.    History of abnormal Pap smear: YES   PAP / HPV Latest Ref Rng & Units 11/12/2018   HPV16 NEG Negative   HPV18 NEG Negative   HRHPV NEG Positive(A)     Reviewed and updated as needed this visit by clinical staff   Tobacco  Allergies  Meds  Problems  Med Hx  Surg Hx  Fam Hx        Reviewed and updated as needed this visit by Provider   Tobacco  Allergies  Meds  Problems  Med Hx  Surg Hx  Fam Hx         Past Medical History:   Diagnosis Date     NO ACTIVE PROBLEMS       Past Surgical History:   Procedure Laterality Date     NO HISTORY OF SURGERY       Review of Systems   Constitutional: Negative for chills and fever.   HENT: Negative for congestion, ear pain, hearing loss and sore throat.    Eyes: Negative for pain and visual disturbance.   Respiratory: Negative for cough and shortness of breath.    Cardiovascular: Negative for chest pain, palpitations and peripheral edema.   Gastrointestinal: Negative for abdominal pain, constipation, diarrhea, heartburn, hematochezia and nausea.   Breasts:  Negative for tenderness, breast mass and discharge.   Genitourinary: Negative for dysuria, frequency, genital sores, hematuria, pelvic pain, urgency, vaginal bleeding and vaginal discharge.   Musculoskeletal: Positive for joint swelling. Negative for arthralgias and myalgias.   Skin: Negative for rash.   Neurological: Negative for dizziness, weakness, headaches and paresthesias.   Psychiatric/Behavioral: Negative for mood changes. The patient is not nervous/anxious.       OBJECTIVE:  "  /77   Pulse 73   Temp 98.2  F (36.8  C) (Oral)   Resp 16   Ht 1.485 m (4' 10.47\")   Wt 59.1 kg (130 lb 6.4 oz)   LMP 02/08/2023 (Approximate)   SpO2 99%   Breastfeeding No   BMI 26.82 kg/m    Physical Exam  GENERAL: healthy, alert and no distress  EYES: Eyes grossly normal to inspection, PERRL and conjunctivae and sclerae normal  HENT: ear canals and TM's normal, nose and mouth without ulcers or lesions  NECK: no adenopathy, no asymmetry, masses, or scars and thyroid normal to palpation  RESP: lungs clear to auscultation - no rales, rhonchi or wheezes  CV: regular rate and rhythm, normal S1 S2, no S3 or S4, no murmur, click or rub, no peripheral edema and peripheral pulses strong  ABDOMEN: soft, nontender, no hepatosplenomegaly, no masses and bowel sounds normal  MS: no gross musculoskeletal defects noted, no edema  SKIN: no suspicious lesions or rashes  NEURO: Normal strength and tone, mentation intact and speech normal  PSYCH: mentation appears normal, affect normal/bright  LYMPH: no cervical, supraclavicular, axillary, or inguinal adenopathy    ASSESSMENT/PLAN:   1. Encounter for routine adult health examination with abnormal findings  - reports she completed HepB in [place]  - declined covid, influenza vaccines  - annual visual screening recommended  - biannual dental visits recommended  - CBC with platelets  - Basic metabolic panel  - Lipid panel reflex to direct LDL Fasting  - declined contraception    2. Need for hepatitis C screening test  - Hepatitis C Screen Reflex to HCV RNA Quant and Genotype    3. Cervical cancer screening  - declined today as her 3 y/o son is with her.     4. Dysmenorrhea  5. Menorrhagia with regular cycle  Considering endometriosis, PCOS, adenomyosis, polyps, fibroids. Pelvic US ordered to look for cause of heavy bleeding. Hemoglobin in 8s.   - US Pelvic Complete with Transvaginal; Future  - acetaminophen (TYLENOL) 500 MG tablet; Take 1-2 tablets (500-1,000 mg) by " "mouth every 6 hours as needed for mild pain  Dispense: 100 tablet; Refill: 3  - ibuprofen (ADVIL/MOTRIN) 400 MG tablet; Take 1-2 tablets (400-800 mg) by mouth every 8 hours as needed for moderate pain (4-6)  Dispense: 100 tablet; Refill: 3    6. Anemia due to blood loss, acute  Restart due to ongoing menorrhagia, starting to crave ice. Hemoglobin in 8s.   - ferrous sulfate (FEROSUL) 325 (65 Fe) MG tablet; Take 1 tablet (325 mg) by mouth daily (with breakfast)  Dispense: 30 tablet; Refill: 3    7. Swelling of right wrist  Ganglion cyst vs synovial cyst.   - US Upper Extremity Non Vascular Right; Future   - referral to hand surgery after ultrasound results as patient wants it removed.     Patient has been advised of split billing requirements and indicates understanding: Yes    COUNSELING:  Reviewed preventive health counseling, as reflected in patient instructions  Special attention given to:        Regular exercise       Healthy diet/nutrition       Vision screening       Contraception       Consider Hep C screening for all patients one time for ages 18-79 years    BMI:   Estimated body mass index is 26.82 kg/m  as calculated from the following:    Height as of this encounter: 1.485 m (4' 10.47\").    Weight as of this encounter: 59.1 kg (130 lb 6.4 oz).   Weight management plan: Discussed healthy diet and exercise guidelines      She reports that she has never smoked. She has never used smokeless tobacco.    Benita Behm, MD PGY3  [facility]  03/08/23    I precepted today with Dr. Noel.  " Detail Level: Simple Detail Level: Detailed Detail Level: Zone When Should The Patient Follow-Up For Their Next Full-Body Skin Exam?: 6 Months Quality 137: Melanoma: Continuity Of Care - Recall System: Patient information entered into a recall system that includes: target date for the next exam specified AND a process to follow up with patients regarding missed or unscheduled appointments

## 2023-03-08 NOTE — PROGRESS NOTES
Preceptor Attestation:    I discussed the patient with the resident and evaluated the patient in person. I have verified the content of the note, which accurately reflects my assessment of the patient and the plan of care.   Supervising Physician:  Francisco Noel DO.

## 2023-03-09 ENCOUNTER — TELEPHONE (OUTPATIENT)
Dept: FAMILY MEDICINE | Facility: CLINIC | Age: 38
End: 2023-03-09
Payer: COMMERCIAL

## 2023-03-09 LAB — HCV AB SERPL QL IA: NONREACTIVE

## 2023-03-09 NOTE — TELEPHONE ENCOUNTER
----- Message from Benita Kay Behm, MD sent at 3/9/2023 11:23 AM CST -----  Khris Glesaon,   Can you call this patient and let her know her hemoglobin is getting very low again. She should definitely restart iron supplement and schedule the pelvic ultrasound to look for source of her heavy bleeding.   If she would like to get set up for iron infusion, I can order that for her, too, to give her an iron boost.   The rest of her labs look normal.   Thanks!  Dr. JO

## 2023-03-09 NOTE — TELEPHONE ENCOUNTER
Requested information given to pt who voices understanding. Patient will restart the iron supplements and is interested in starting iron infusions. Patient needs assistance in scheduling the pelvic US    Note routed to DrBehm  And ref lena Perez RN

## 2023-03-27 ENCOUNTER — TELEPHONE (OUTPATIENT)
Dept: FAMILY MEDICINE | Facility: CLINIC | Age: 38
End: 2023-03-27
Payer: COMMERCIAL

## 2023-03-27 NOTE — TELEPHONE ENCOUNTER
Alayna Echevarria,    Pt called questioning if Iron infusion orders has been order for pt. Pt is interested in getting it done.     Please advise.     Thank you,  Rayna Juan   0 = swallows foods/liquids without difficulty

## 2023-04-05 ENCOUNTER — HOSPITAL ENCOUNTER (OUTPATIENT)
Dept: ULTRASOUND IMAGING | Facility: HOSPITAL | Age: 38
Discharge: HOME | End: 2023-04-05
Attending: STUDENT IN AN ORGANIZED HEALTH CARE EDUCATION/TRAINING PROGRAM
Payer: COMMERCIAL

## 2023-04-05 DIAGNOSIS — M25.431 SWELLING OF RIGHT WRIST: ICD-10-CM

## 2023-04-05 DIAGNOSIS — N92.0 MENORRHAGIA WITH REGULAR CYCLE: ICD-10-CM

## 2023-04-05 DIAGNOSIS — N94.6 DYSMENORRHEA: ICD-10-CM

## 2023-04-05 PROCEDURE — 76856 US EXAM PELVIC COMPLETE: CPT

## 2023-04-05 PROCEDURE — 76882 US LMTD JT/FCL EVL NVASC XTR: CPT | Mod: RT

## 2023-04-07 ENCOUNTER — TELEPHONE (OUTPATIENT)
Dept: FAMILY MEDICINE | Facility: CLINIC | Age: 38
End: 2023-04-07
Payer: COMMERCIAL

## 2023-04-07 DIAGNOSIS — M67.431 GANGLION CYST OF WRIST, RIGHT: Primary | ICD-10-CM

## 2023-04-07 DIAGNOSIS — N92.0 MENORRHAGIA WITH REGULAR CYCLE: ICD-10-CM

## 2023-04-07 DIAGNOSIS — N94.6 DYSMENORRHEA: ICD-10-CM

## 2023-04-07 NOTE — TELEPHONE ENCOUNTER
Called patient to discuss results of upper extremity and pelvis.   She would like ganglion cyst removed, wants hand surgeon referral.   Discussed undifferentiated pelvic ultrasound results and potential need for pelvic MRI. Due to patients ongoing heavy bleeding and ?cause, will refer to OB/GYN.   Tolerating iron pills well.   Recommended recheck hemoglobin in a few months.     1. Ganglion cyst of wrist, right  - Orthopedic  Referral; Future    2. Dysmenorrhea  3. Menorrhagia with regular cycle  - Ob/Gyn Referral; Future    Benita Behm, MD PGY3  [facility]  04/07/23    I discussed the plan today with Dr. Isabel.

## 2023-06-23 ENCOUNTER — TELEPHONE (OUTPATIENT)
Dept: FAMILY MEDICINE | Facility: CLINIC | Age: 38
End: 2023-06-23
Payer: COMMERCIAL

## 2023-06-23 NOTE — TELEPHONE ENCOUNTER
[facility] phone call message- general phone call:    Reason for call: The Pt called to ask to talk to the nurse about her period and what are her options to not get it so severely     Return call needed: Yes    OK to leave a message on voice mail? Yes    Primary language: English      needed? No    Call taken on June 23, 2023 at 3:02 PM by Jean-Paul Aguilar

## 2023-06-23 NOTE — TELEPHONE ENCOUNTER
Pt states that since she had her  2 yrs ago her periods have been very heavy with large golf ball size blood clots and painful cramping that feels like contractions.  She states that she ends up having to take 8-12 Ibuprofen per day during her period and would prefer not to take anything.  She states that she had ultrasound and was told it was nl but would like to know what can be done to help her with her symptoms besides birth control.  She states that she has long h/o low iron and does get dizzy during her periods.  Assisted pt with scheduling an appt with Dr Abernathy for Wed, 23 at 3:50.  Routed note to Dr Abernathy./Kasey Dhillon RN BSN

## 2023-07-05 NOTE — TELEPHONE ENCOUNTER
Patient called to check in on the status of this encounter. He is in need of blood glucose monitoring supplies that are covered by his insurance. Patient requesting to speak with provider, if possible. Declined [facility]V. Pharmacy. ..  [facility] no showed her 2 appts at [facility] infusion on 1/6/21 and 1/13/21 for iron infusions and also no showed her appt with Dr John at [facility] for consult for short vascular cervix.  She has no upcoming appts scheduled with Dr Klein.    [facility] for pt./NG

## 2023-08-21 ENCOUNTER — OFFICE VISIT (OUTPATIENT)
Dept: ORTHOPEDICS | Facility: CLINIC | Age: 38
End: 2023-08-21
Attending: FAMILY MEDICINE
Payer: COMMERCIAL

## 2023-08-21 ENCOUNTER — ANCILLARY PROCEDURE (OUTPATIENT)
Dept: GENERAL RADIOLOGY | Facility: CLINIC | Age: 38
End: 2023-08-21
Attending: ORTHOPAEDIC SURGERY
Payer: COMMERCIAL

## 2023-08-21 VITALS
DIASTOLIC BLOOD PRESSURE: 65 MMHG | SYSTOLIC BLOOD PRESSURE: 98 MMHG | BODY MASS INDEX: 27.35 KG/M2 | WEIGHT: 130.29 LBS | HEIGHT: 58 IN

## 2023-08-21 DIAGNOSIS — M67.431 GANGLION CYST OF WRIST, RIGHT: ICD-10-CM

## 2023-08-21 PROCEDURE — 99203 OFFICE O/P NEW LOW 30 MIN: CPT | Mod: 25 | Performed by: ORTHOPAEDIC SURGERY

## 2023-08-21 PROCEDURE — 73110 X-RAY EXAM OF WRIST: CPT | Mod: TC | Performed by: RADIOLOGY

## 2023-08-21 PROCEDURE — 20612 ASPIRATE/INJ GANGLION CYST: CPT | Mod: RT | Performed by: ORTHOPAEDIC SURGERY

## 2023-08-21 RX ADMIN — TRIAMCINOLONE ACETONIDE 40 MG: 40 INJECTION, SUSPENSION INTRA-ARTICULAR; INTRAMUSCULAR at 14:41

## 2023-08-21 RX ADMIN — LIDOCAINE HYDROCHLORIDE 1 ML: 10 INJECTION, SOLUTION INFILTRATION; PERINEURAL at 14:41

## 2023-08-21 RX ADMIN — BUPIVACAINE HYDROCHLORIDE 1 ML: 2.5 INJECTION, SOLUTION EPIDURAL; INFILTRATION; INTRACAUDAL at 14:41

## 2023-08-21 NOTE — PATIENT INSTRUCTIONS
Thank you for choosing [facility]    Dr. Cancino Locations:    [facility] [place]   [id number] [place] [address], [address]  [place], [place] [id number] [place] [id number]   Appointments: [phone] Appointments: [phone]   Fax: [phone] Fax: [phone]       Follow up: As needed, please call [phone] to schedule a follow up appointment if condition worsens or fails to improve.    Lab orders have been placed. You can go to your [facility] location that has a lab. Please call your [facility] to schedule a lab appointment. [facility] has a walk-in hospital lab for your convenience.     Steroid injection of the right wrist was performed today in clinic    - Would not soak in a hot tub, bath or swimming pool for 48 hours  - Ok to shower  - Ice today and only do your normal amounts of activity  - The lidocaine (what is giving you pain relief right now) will likely stop working in 1-2 hours.  You will then have pain again, similar to before you received the injection. The corticosteroid will not start working until approximately 1-2 weeks from now.  In a small percentage of people, cortisone can cause flushing/redness in the face. This usually lasts for 1-3 days and resolves. Cool compress and Ibuprofen/Tylenol can help if this happens.    For any questions please contact my office, [phone].

## 2023-08-21 NOTE — PROGRESS NOTES
S: Patient is a 38 year old, right hand dominant female seen today in consultation for ganglion cyst of the right hand.  They report onset of symptoms 2 years. No injury, came on over time.   Pain is located dorsal side of hand, and described as achy pain.  Tightness on the dorsal aspect of the hand and wrist. They have tried the following therapies: ibuprofen as needed. .      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            Patient Active Problem List   Diagnosis    Abnormal Pap smear of cervix    Severe anemia            Past Medical History:   Diagnosis Date    NO ACTIVE PROBLEMS             Past Surgical History:   Procedure Laterality Date    NO HISTORY OF SURGERY              Social History     Tobacco Use    Smoking status: Some Days     Types: Cigarettes    Smokeless tobacco: Never   Substance Use Topics    Alcohol use: Not Currently            Family History   Problem Relation Age of Onset    Diabetes Mother 40    Hypertension Mother 40    Diabetes Father 60    Hypertension Father 60    Heart Disease No family hx of     Cancer No family hx of              No Known Allergies         Current Outpatient Medications   Medication Sig Dispense Refill    acetaminophen (TYLENOL) 500 MG tablet Take 1-2 tablets (500-1,000 mg) by mouth every 6 hours as needed for mild pain 100 tablet 3    ferrous sulfate (FEROSUL) 325 (65 Fe) MG tablet Take 1 tablet (325 mg) by mouth daily (with breakfast) 30 tablet 3    ibuprofen (ADVIL/MOTRIN) 400 MG tablet Take 1-2 tablets (400-800 mg) by mouth every 8 hours as needed for moderate pain (4-6) 100 tablet 3    Prenatal Vit-Fe Fumarate-FA (PRENATAL VITAMIN) 27-0.8 MG TABS Take 1 tablet by mouth daily (Patient not taking: Reported on 3/8/2023) 100 tablet 3          Review Of Systems  Skin: negative  Eyes: negative  Ears/Nose/Throat: negative  Respiratory: No shortness of breath, dyspnea on exertion, cough, or hemoptysis    O: Physical Exam:  Dorsal mass R wrist distal to davy's  tubercle.  Adequate flexion/extension fingers and thumb.  No erythema, no thrill or bruit.  NO tinnel's.  Adequate wrist extension/flexion.    Lab:update inflammatory markers and arthritic profile    Images:  Narrative & Impression   XR WRIST RIGHT G/E 3 VIEWS 8/21/2023 2:11 PM      HISTORY: Ganglion cyst of wrist, right     COMPARISON: None.                                                                       IMPRESSION: Focal soft tissue prominence over the dorsal aspect of the  wrist could represent a synovial or ganglion cyst. No mineralization  is seen within this region. No evidence for fracture. Normal carpal  alignment.            A:  Dorsal ganglion R wrist just distal to davy's tubercle    P:  Aspirate and inject mass/cyst after verbal and written consent.  After ethyl chloride and chloroprep.  No issues identified, gelatinous material aspirated and re injected with lidocaine/marcaine/triamcinolone.  Will notify if exacerbation symptoms  See back 6 weeks  Obtain labs           In addition to the above assessment and plan each active problem on Kendrick's problem list was evaluated today. This included the questioning of Kendrick for any medication problems. We will continue the current treatment plan for these active problems except as noted.      Hand / Upper Extremity Injection/Arthrocentesis: R wrist    Date/Time: 8/21/2023 2:41 PM    Performed by: Tej Cancino MD  Authorized by: Tej Cancino MD    Indications:  Pain  Needle Size:  18 G  Guidance: landmark    Approach:  Dorsal  Condition: carpal ganglion, dorsal      Site:  R wrist  Medications:  40 mg triamcinolone 40 MG/ML; 1 mL BUPivacaine HCl (PF) 0.25 %; 1 mL lidocaine 1 %  Aspirate amount (mL):  0.5  Outcome:  Tolerated well, no immediate complications  Procedure discussed: discussed risks, benefits, and alternatives    Consent Given by:  Patient  Timeout: timeout called immediately prior to procedure    Prep: patient was prepped and draped in  usual sterile fashion      I evaluated and injected patient  Tej Cancino MD

## 2023-08-21 NOTE — LETTER
8/21/2023         RE: Kendrick Jacobs  [address]  [place] [address]        Dear Colleague,    Thank you for referring your patient, Kendrick Jacobs, to the [facility] [place]. Please see a copy of my visit note below.    S: Patient is a 38 year old, right hand dominant female seen today in consultation for ganglion cyst of the right hand.  They report onset of symptoms 2 years. No injury, came on over time.   Pain is located dorsal side of hand, and described as achy pain.  Tightness on the dorsal aspect of the hand and wrist. They have tried the following therapies: ibuprofen as needed. .      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            Patient Active Problem List   Diagnosis     Abnormal Pap smear of cervix     Severe anemia            Past Medical History:   Diagnosis Date     NO ACTIVE PROBLEMS             Past Surgical History:   Procedure Laterality Date     NO HISTORY OF SURGERY              Social History     Tobacco Use     Smoking status: Some Days     Types: Cigarettes     Smokeless tobacco: Never   Substance Use Topics     Alcohol use: Not Currently            Family History   Problem Relation Age of Onset     Diabetes Mother 40     Hypertension Mother 40     Diabetes Father 60     Hypertension Father 60     Heart Disease No family hx of      Cancer No family hx of              No Known Allergies         Current Outpatient Medications   Medication Sig Dispense Refill     acetaminophen (TYLENOL) 500 MG tablet Take 1-2 tablets (500-1,000 mg) by mouth every 6 hours as needed for mild pain 100 tablet 3     ferrous sulfate (FEROSUL) 325 (65 Fe) MG tablet Take 1 tablet (325 mg) by mouth daily (with breakfast) 30 tablet 3     ibuprofen (ADVIL/MOTRIN) 400 MG tablet Take 1-2 tablets (400-800 mg) by mouth every 8 hours as needed for moderate pain (4-6) 100 tablet 3     Prenatal Vit-Fe Fumarate-FA (PRENATAL VITAMIN) 27-0.8 MG TABS Take 1 tablet by mouth daily (Patient not taking:  Reported on 3/8/2023) 100 tablet 3          Review Of Systems  Skin: negative  Eyes: negative  Ears/Nose/Throat: negative  Respiratory: No shortness of breath, dyspnea on exertion, cough, or hemoptysis    O: Physical Exam:  Dorsal mass R wrist distal to davy's tubercle.  Adequate flexion/extension fingers and thumb.  No erythema, no thrill or bruit.  NO tinnel's.  Adequate wrist extension/flexion.    Lab:update inflammatory markers and arthritic profile    Images:  Narrative & Impression   XR WRIST RIGHT G/E 3 VIEWS 8/21/2023 2:11 PM      HISTORY: Ganglion cyst of wrist, right     COMPARISON: None.                                                                       IMPRESSION: Focal soft tissue prominence over the dorsal aspect of the  wrist could represent a synovial or ganglion cyst. No mineralization  is seen within this region. No evidence for fracture. Normal carpal  alignment.            A:  Dorsal ganglion R wrist just distal to davy's tubercle    P:  Aspirate and inject mass/cyst after verbal and written consent.  After ethyl chloride and chloroprep.  No issues identified, gelatinous material aspirated and re injected with lidocaine/marcaine/triamcinolone.  Will notify if exacerbation symptoms  See back 6 weeks  Obtain labs           In addition to the above assessment and plan each active problem on Kendrick's problem list was evaluated today. This included the questioning of Kendrick for any medication problems. We will continue the current treatment plan for these active problems except as noted.      Hand / Upper Extremity Injection/Arthrocentesis: R wrist    Date/Time: 8/21/2023 2:41 PM    Performed by: Tej Cancino MD  Authorized by: Tej Cancino MD    Indications:  Pain  Needle Size:  18 G  Guidance: landmark    Approach:  Dorsal  Condition: carpal ganglion, dorsal      Site:  R wrist  Medications:  40 mg triamcinolone 40 MG/ML; 1 mL BUPivacaine HCl (PF) 0.25 %; 1 mL lidocaine 1 %  Aspirate amount  (mL):  0.5  Outcome:  Tolerated well, no immediate complications  Procedure discussed: discussed risks, benefits, and alternatives    Consent Given by:  Patient  Timeout: timeout called immediately prior to procedure    Prep: patient was prepped and draped in usual sterile fashion      I evaluated and injected patient  Cristina Fish MD      Again, thank you for allowing me to participate in the care of your patient.        Sincerely,        CRISTINA FISH MD

## 2023-08-29 RX ORDER — LIDOCAINE HYDROCHLORIDE 10 MG/ML
1 INJECTION, SOLUTION INFILTRATION; PERINEURAL
Status: SHIPPED | OUTPATIENT
Start: 2023-08-21

## 2023-08-29 RX ORDER — BUPIVACAINE HYDROCHLORIDE 2.5 MG/ML
1 INJECTION, SOLUTION EPIDURAL; INFILTRATION; INTRACAUDAL
Status: SHIPPED | OUTPATIENT
Start: 2023-08-21

## 2023-08-29 RX ORDER — TRIAMCINOLONE ACETONIDE 40 MG/ML
40 INJECTION, SUSPENSION INTRA-ARTICULAR; INTRAMUSCULAR
Status: SHIPPED | OUTPATIENT
Start: 2023-08-21

## 2023-11-02 ENCOUNTER — OFFICE VISIT (OUTPATIENT)
Dept: FAMILY MEDICINE | Facility: CLINIC | Age: 38
End: 2023-11-02
Payer: COMMERCIAL

## 2023-11-02 VITALS
WEIGHT: 131.4 LBS | DIASTOLIC BLOOD PRESSURE: 67 MMHG | BODY MASS INDEX: 27.58 KG/M2 | TEMPERATURE: 98.4 F | RESPIRATION RATE: 18 BRPM | SYSTOLIC BLOOD PRESSURE: 100 MMHG | OXYGEN SATURATION: 96 % | HEART RATE: 104 BPM | HEIGHT: 58 IN

## 2023-11-02 DIAGNOSIS — M67.951 TENDINOPATHY OF RIGHT GLUTEAL REGION: Primary | ICD-10-CM

## 2023-11-02 DIAGNOSIS — L81.9 PIGMENTED SKIN LESION: ICD-10-CM

## 2023-11-02 DIAGNOSIS — M53.3 SI (SACROILIAC) JOINT DYSFUNCTION: ICD-10-CM

## 2023-11-02 PROCEDURE — 90746 HEPB VACCINE 3 DOSE ADULT IM: CPT | Performed by: STUDENT IN AN ORGANIZED HEALTH CARE EDUCATION/TRAINING PROGRAM

## 2023-11-02 PROCEDURE — 99213 OFFICE O/P EST LOW 20 MIN: CPT | Mod: 25 | Performed by: STUDENT IN AN ORGANIZED HEALTH CARE EDUCATION/TRAINING PROGRAM

## 2023-11-02 PROCEDURE — 90677 PCV20 VACCINE IM: CPT | Performed by: STUDENT IN AN ORGANIZED HEALTH CARE EDUCATION/TRAINING PROGRAM

## 2023-11-02 PROCEDURE — 90471 IMMUNIZATION ADMIN: CPT | Performed by: STUDENT IN AN ORGANIZED HEALTH CARE EDUCATION/TRAINING PROGRAM

## 2023-11-02 PROCEDURE — 90472 IMMUNIZATION ADMIN EACH ADD: CPT | Performed by: STUDENT IN AN ORGANIZED HEALTH CARE EDUCATION/TRAINING PROGRAM

## 2023-11-02 RX ORDER — HYDROCORTISONE 2.5 %
CREAM (GRAM) TOPICAL 2 TIMES DAILY
Qty: 30 G | Refills: 0 | Status: SHIPPED | OUTPATIENT
Start: 2023-11-02

## 2023-11-02 NOTE — NURSING NOTE
Prior to immunization administration, verified patients identity using patient s name and date of birth. Please see Immunization Activity for additional information.     Screening Questionnaire for Adult Immunization    Are you sick today?   No   Do you have allergies to medications, food, a vaccine component or latex?   No   Have you ever had a serious reaction after receiving a vaccination?   No   Do you have a long-term health problem with heart, lung, kidney, or metabolic disease (e.g., diabetes), asthma, a blood disorder, no spleen, complement component deficiency, a cochlear implant, or a spinal fluid leak?  Are you on long-term aspirin therapy?   No   Do you have cancer, leukemia, HIV/AIDS, or any other immune system problem?   No   Do you have a parent, brother, or sister with an immune system problem?   No   In the past 3 months, have you taken medications that affect  your immune system, such as prednisone, other steroids, or anticancer drugs; drugs for the treatment of rheumatoid arthritis, Crohn s disease, or psoriasis; or have you had radiation treatments?   No   Have you had a seizure, or a brain or other nervous system problem?   No   During the past year, have you received a transfusion of blood or blood    products, or been given immune (gamma) globulin or antiviral drug?   No   For women: Are you pregnant or is there a chance you could become       pregnant during the next month?   No   Have you received any vaccinations in the past 4 weeks?   No     Immunization questionnaire answers were all negative.      Patient instructed to remain in clinic for 15 minutes afterwards, and to report any adverse reactions.     Screening performed by Violet Norton on 11/2/2023 at 1:44 PM.

## 2023-11-02 NOTE — PATIENT INSTRUCTIONS
Thanks for coming in today! During the visit we talked about:     1) Home exercises, if not enough then schedule physical therapy    2) Ice, heat, voltaren gel (arthritis section at the store), ibuprofen 600mg every 8 hours x 5 days    Let me know if you have any questions or concerns. You can send a Impressto message or call the clinic.     Mo Noel MD, MPH

## 2023-11-06 ASSESSMENT — ENCOUNTER SYMPTOMS: HIP PAIN: 1

## 2023-11-06 NOTE — PROGRESS NOTES
"  Assessment & Plan     Tendinopathy of right gluteal region  Ttp along the glute tendons. Will trial PT to strengthen and topicals. OTC meds as needed   - Physical Therapy Referral; Future  - diclofenac (VOLTAREN) 1 % topical gel; Apply 4 g topically 4 times daily as needed for moderate pain    SI (sacroiliac) joint dysfunction  - Physical Therapy Referral; Future  - diclofenac (VOLTAREN) 1 % topical gel; Apply 4 g topically 4 times daily as needed for moderate pain    Pigmented skin lesion  Discussed possibility of referral to derm to discuss other options for melasma   - hydrocortisone 2.5 % cream; Apply topically 2 times daily             Nicotine/Tobacco Cessation:  She reports that she has been smoking cigarettes. She has never used smokeless tobacco.  Nicotine/Tobacco Cessation Plan:   Information offered: Patient not interested at this time      BMI:   Estimated body mass index is 27.32 kg/m  as calculated from the following:    Height as of this encounter: 1.477 m (4' 10.15\").    Weight as of this encounter: 59.6 kg (131 lb 6.4 oz).       4-6 weeks if no improvement     Mo Noel MD  [facility]    Cole Marks is a 38 year old, presenting for the following health issues:  Hip Pain (Right hip pain when try to get on the car been going on for a while now like half a year )      Hip Pain           Concern - hip and low back pain  Onset: few weeks   Description: dull ache with occassional sharpness  Intensity: mild, moderate  Progression of Symptoms:  worsening  Accompanying Signs & Symptoms: no radicular symptoms  Previous history of similar problem: has had similar in the past which resolved   Precipitating factors:        Worsened by: sitting, driving, bending   Alleviating factors:        Improved by: resting, OTC meds           Review of Systems   Positive ROS was noted in the HPI, otherwise negative.         Objective    /67 (BP Location: Right arm, Patient " "Position: Sitting, Cuff Size: Adult Regular)   Pulse 104   Temp 98.4  F (36.9  C) (Oral)   Resp 18   Ht 1.477 m (4' 10.15\")   Wt 59.6 kg (131 lb 6.4 oz)   LMP 10/12/2023 (Approximate)   SpO2 96%   BMI 27.32 kg/m    Body mass index is 27.32 kg/m .  Physical Exam  HENT:      Head: Normocephalic.      Nose: Nose normal.      Mouth/Throat:      Mouth: Mucous membranes are moist.   Eyes:      Extraocular Movements: Extraocular movements intact.      Conjunctiva/sclera: Conjunctivae normal.   Cardiovascular:      Rate and Rhythm: Normal rate and regular rhythm.   Pulmonary:      Effort: Pulmonary effort is normal.      Breath sounds: Normal breath sounds.   Abdominal:      Palpations: Abdomen is soft.   Musculoskeletal:         General: Normal range of motion.      Cervical back: Normal range of motion.      Comments: No deformity. TTP along the glute tendons near insertion, SI joint. Good ROM. Strength of hip 5/5 in flexion. Normal sensation throughout. Positive stork test. Negative straight leg test.    Skin:     General: Skin is warm.      Capillary Refill: Capillary refill takes less than 2 seconds.   Neurological:      General: No focal deficit present.      Mental Status: She is alert.                              "

## 2023-12-12 ENCOUNTER — TELEPHONE (OUTPATIENT)
Dept: FAMILY MEDICINE | Facility: CLINIC | Age: 38
End: 2023-12-12
Payer: COMMERCIAL

## 2023-12-12 NOTE — TELEPHONE ENCOUNTER
[facility] phone call message- general phone call:    Reason for call: She is having some chest pain and needs to be seen not sure if this can wait till our next opening 12/19    Action desired: call back.    Return call needed: Yes    OK to leave a message on voice mail? Yes    Advised patient to response may take up to 2 business days: Yes    Primary language: English      needed? No    Call taken on December 12, 2023 at 2:33 PM by Kristen Ordaz

## 2023-12-12 NOTE — TELEPHONE ENCOUNTER
"Outgoing call placed to patient.     Primary Concern: Chest discomfort    Symptoms: Pt reports she has been more winded then normal when walking up the stairs. Carrying her toddler makes the symptoms worsen. She described the pain as \"feeling of cold air in her lungs with chest discomfort over the center\". Denies other symptoms. Reports increase in stress levels lately as well as more frequent headaches.     Time Frame: 2 weeks    Recent changes to diet, lifestyle, or medication: none, no hx of cardiac conditions or family hx    Recommendation: clinic visit tomorrow or ED if symptoms change or worsen    Patient agrees to plan of care. All concerns discussed. Patient will present to clinic tomorrow with Dr. Roth. [facility] ED precautions reviewed and pt verbalized understanding.     LEANNA Da Silva     "

## 2023-12-13 ENCOUNTER — TELEPHONE (OUTPATIENT)
Dept: FAMILY MEDICINE | Facility: CLINIC | Age: 38
End: 2023-12-13

## 2023-12-13 ENCOUNTER — OFFICE VISIT (OUTPATIENT)
Dept: FAMILY MEDICINE | Facility: CLINIC | Age: 38
End: 2023-12-13
Payer: COMMERCIAL

## 2023-12-13 VITALS
BODY MASS INDEX: 28.87 KG/M2 | HEART RATE: 78 BPM | RESPIRATION RATE: 20 BRPM | SYSTOLIC BLOOD PRESSURE: 93 MMHG | WEIGHT: 143.2 LBS | TEMPERATURE: 98.1 F | DIASTOLIC BLOOD PRESSURE: 57 MMHG | OXYGEN SATURATION: 100 % | HEIGHT: 59 IN

## 2023-12-13 DIAGNOSIS — R06.02 SHORTNESS OF BREATH: ICD-10-CM

## 2023-12-13 DIAGNOSIS — Z86.2 HISTORY OF ANEMIA: Primary | ICD-10-CM

## 2023-12-13 DIAGNOSIS — F41.9 ANXIETY: ICD-10-CM

## 2023-12-13 LAB
ERYTHROCYTE [DISTWIDTH] IN BLOOD BY AUTOMATED COUNT: 19.9 % (ref 10–15)
HCG UR QL: NEGATIVE
HCT VFR BLD AUTO: 26.6 % (ref 35–47)
HGB BLD-MCNC: 7.2 G/DL (ref 11.7–15.7)
HOLD SPECIMEN: NORMAL
MCH RBC QN AUTO: 17.6 PG (ref 26.5–33)
MCHC RBC AUTO-ENTMCNC: 27.1 G/DL (ref 31.5–36.5)
MCV RBC AUTO: 65 FL (ref 78–100)
PLATELET # BLD AUTO: 236 10E3/UL (ref 150–450)
RBC # BLD AUTO: 4.1 10E6/UL (ref 3.8–5.2)
WBC # BLD AUTO: 5.7 10E3/UL (ref 4–11)

## 2023-12-13 PROCEDURE — 81025 URINE PREGNANCY TEST: CPT

## 2023-12-13 PROCEDURE — 36415 COLL VENOUS BLD VENIPUNCTURE: CPT

## 2023-12-13 PROCEDURE — 83540 ASSAY OF IRON: CPT

## 2023-12-13 PROCEDURE — 82728 ASSAY OF FERRITIN: CPT

## 2023-12-13 PROCEDURE — 85027 COMPLETE CBC AUTOMATED: CPT

## 2023-12-13 PROCEDURE — 83550 IRON BINDING TEST: CPT

## 2023-12-13 PROCEDURE — 99214 OFFICE O/P EST MOD 30 MIN: CPT | Mod: GC

## 2023-12-13 NOTE — PROGRESS NOTES
Preceptor Attestation:    I discussed the patient with the resident and evaluated the patient in person. I have verified the content of the note, which accurately reflects my assessment of the patient and the plan of care.   Supervising Physician:  Allyson Hartmann MD

## 2023-12-13 NOTE — TELEPHONE ENCOUNTER
Discussed need for ED. Patient states her mother is in the hospital so she will go downstairs and check into the ED. No further questions. Agrees to call the clinic with further needs, LEANNA Da Silva

## 2023-12-13 NOTE — PROGRESS NOTES
**Addendum  -I called patient at 1400 on 12/13/2023. I was unable to reach her, but I left a detailed voicemail. Given monalisa newfound shortness of breath and preliminary hemoglobin of 7.1, I recommended that she proceed to the ED for further workup, evaluation, and consideration of possible blood transfusion.     I called the patient again at 1710 on 12/13/2023: She states that she called the clinic back and talked with the nurse who also recommended the emergency department for follow-up.  She states that she will head to the emergency department presently for further evaluation.  She had no further questions or concerns at this time.  She denied any new symptoms other than the shortness of breath that we had discussed in clinic today.      Assessment & Plan     History of anemia  -Patient has a history of anemia.  She reports a previous history of heavy menstrual cycles.  She reports that over the last 3 months her menstrual cycles have normalized and she only uses 1-2 pads per day.  She is no longer interested in having children and is open to contraceptive management.  I discussed with patient that given her history of shortness of breath and anemia I would like to recheck a CBC as some of her shortness of breath may be mediated by anemia.  I recommended that she continue with her at home iron supplementation I will discuss with her the results as they become available.  Pregnancy test ordered in case patient would like to pursue contraceptive management.  - CBC with Platelets and Reflex to Iron Studies  - HCG qualitative urine    Shortness of breath  -See above, I suspect shortness of breath may be related to anemia as well as a recent uptick in anxiety.  I did discuss with patient that I would like to see her within the next week for follow-up regarding these concerns.  Patient verbalized clear understanding and agreement to this treatment plan and had no further questions or concerns at this  "time.    Anxiety  -At this time, patient is not interested in medication or psychotherapy management of her anxiety.  I enforced that these are options which will be always available to her if she changes her mind.  She verbalized clear understanding and agreement to this treatment plan and would think further and discuss at our follow-up visit.      Return in about 1 week (around 12/20/2023) for Follow up.    Sagar DO Ira  [facility] ELLIOT Marks is a 38 year old, presenting for the following health issues:  Chest Pain (Per pt states that she has been feeling winded or feeling sob when she goes up the stairs or picks up something heavy x 2 weeks now- no chest pain)      12/13/2023    11:29 AM   Additional Questions   Roomed by Mao   Accompanied by self       HPI   Presents today reporting shortness of breath.  When asked if this represented chest pain, she stated that she feels a sensation of shortness of breath associated with anxiety.  She does not describe chest pain.  She denies headache, fever, chills, nausea, vomiting, chest pain, changes in urinary pattern.  She reports menstrual cramping with her cycles and takes Advil approximately every 6 hours when she is having a menstrual period.  She is recently taking care of of her dependent mother who is in need of all of her activities of daily life to be assisted with.  In addition she takes care of of a 2-year-old requiring G-tube.  She feels as though an increase in stress is increasing her shortness of breath.  She reports no additional concerns at this time.      Review of Systems   Constitutional, HEENT, cardiovascular, pulmonary, gi and gu systems are negative, except as otherwise noted.  -As reviewed, see HPI for pertinent positives and negatives.  Sagar Roth DO        Objective    BP 93/57   Pulse 78   Temp 98.1  F (36.7  C) (Oral)   Resp 20   Ht 1.486 m (4' 10.5\")   Wt 65 kg (143 lb 3.2 oz)   LMP 10/12/2023 " (Approximate)   SpO2 100%   BMI 29.42 kg/m    Body mass index is 29.42 kg/m .  Physical Exam  Constitutional:       General: She is not in acute distress.     Appearance: She is not ill-appearing.   HENT:      Head: Normocephalic and atraumatic.      Right Ear: External ear normal.      Left Ear: External ear normal.      Nose: Nose normal.      Mouth/Throat:      Mouth: Mucous membranes are moist.   Eyes:      Pupils: Pupils are equal, round, and reactive to light.   Cardiovascular:      Rate and Rhythm: Normal rate and regular rhythm.      Heart sounds: Normal heart sounds. No murmur heard.     No friction rub. No gallop.   Pulmonary:      Effort: Pulmonary effort is normal. No respiratory distress.      Breath sounds: Normal breath sounds. No wheezing or rales.   Abdominal:      General: There is no distension.      Tenderness: There is no abdominal tenderness. There is no right CVA tenderness or guarding.   Skin:     Capillary Refill: Capillary refill takes less than 2 seconds.      Findings: No rash.   Neurological:      General: No focal deficit present.      Mental Status: She is alert and oriented to person, place, and time.   Psychiatric:         Behavior: Behavior normal.         ----- Service Performed and Documented by Resident or Fellow ------    Precepted with MD Sagar Lundberg DO

## 2023-12-13 NOTE — TELEPHONE ENCOUNTER
[facility] phone call message- general phone call:    Reason for call:     Patient returning phone call to the doctor. Would like a return phone call asap.    Return call needed: Yes    OK to leave a message on voice mail? Yes    Primary language: English      needed? No    Call taken on December 13, 2023 at 2:53 PM by Nico Jacobs

## 2023-12-14 LAB
FERRITIN SERPL-MCNC: 2 NG/ML (ref 6–175)
IRON BINDING CAPACITY (ROCHE): 418 UG/DL (ref 240–430)
IRON SATN MFR SERPL: 4 % (ref 15–46)
IRON SERPL-MCNC: 17 UG/DL (ref 37–145)

## 2024-03-12 ENCOUNTER — OFFICE VISIT (OUTPATIENT)
Dept: FAMILY MEDICINE | Facility: CLINIC | Age: 39
End: 2024-03-12
Payer: COMMERCIAL

## 2024-03-12 VITALS
HEART RATE: 86 BPM | OXYGEN SATURATION: 100 % | WEIGHT: 135 LBS | TEMPERATURE: 98.6 F | BODY MASS INDEX: 28.34 KG/M2 | RESPIRATION RATE: 16 BRPM | HEIGHT: 58 IN | DIASTOLIC BLOOD PRESSURE: 71 MMHG | SYSTOLIC BLOOD PRESSURE: 103 MMHG

## 2024-03-12 DIAGNOSIS — R00.2 HEART PALPITATIONS: ICD-10-CM

## 2024-03-12 DIAGNOSIS — R06.09 DOE (DYSPNEA ON EXERTION): ICD-10-CM

## 2024-03-12 DIAGNOSIS — D50.9 IRON DEFICIENCY ANEMIA, UNSPECIFIED IRON DEFICIENCY ANEMIA TYPE: Primary | ICD-10-CM

## 2024-03-12 LAB
ACANTHOCYTES BLD QL SMEAR: ABNORMAL
AUER BODIES BLD QL SMEAR: ABNORMAL
BASO STIPL BLD QL SMEAR: ABNORMAL
BASOPHILS # BLD AUTO: 0.1 10E3/UL (ref 0–0.2)
BASOPHILS NFR BLD AUTO: 1 %
BITE CELLS BLD QL SMEAR: ABNORMAL
BLISTER CELLS BLD QL SMEAR: ABNORMAL
BURR CELLS BLD QL SMEAR: ABNORMAL
DACRYOCYTES BLD QL SMEAR: ABNORMAL
ELLIPTOCYTES BLD QL SMEAR: SLIGHT
EOSINOPHIL # BLD AUTO: 0.1 10E3/UL (ref 0–0.7)
EOSINOPHIL NFR BLD AUTO: 2 %
ERYTHROCYTE [DISTWIDTH] IN BLOOD BY AUTOMATED COUNT: 21.4 % (ref 10–15)
FRAGMENTS BLD QL SMEAR: ABNORMAL
GIANT PLATELETS BLD QL SMEAR: ABNORMAL
HCT VFR BLD AUTO: 24.5 % (ref 35–47)
HGB BLD-MCNC: 6.5 G/DL (ref 11.7–15.7)
HGB C CRYSTALS: ABNORMAL
HOWELL-JOLLY BOD BLD QL SMEAR: ABNORMAL
IMM GRANULOCYTES # BLD: 0 10E3/UL
IMM GRANULOCYTES NFR BLD: 0 %
LYMPHOCYTES # BLD AUTO: 1.7 10E3/UL (ref 0–5.3)
LYMPHOCYTES NFR BLD AUTO: 31 %
MCH RBC QN AUTO: 16.9 PG (ref 26.5–33)
MCHC RBC AUTO-ENTMCNC: 26.5 G/DL (ref 31.5–36.5)
MCV RBC AUTO: 64 FL (ref 78–100)
MONOCYTES # BLD AUTO: 0.4 10E3/UL (ref 0–1.3)
MONOCYTES NFR BLD AUTO: 7 %
NEUTROPHILS # BLD AUTO: 3.3 10E3/UL (ref 1.6–8.3)
NEUTROPHILS NFR BLD AUTO: 59 %
NEUTS HYPERSEG BLD QL SMEAR: ABNORMAL
NRBC # BLD AUTO: 0 10E3/UL
NRBC BLD AUTO-RTO: 0 /100
PATH REV: ABNORMAL
PLAT MORPH BLD: ABNORMAL
PLATELET # BLD AUTO: 274 10E3/UL (ref 150–450)
POLYCHROMASIA BLD QL SMEAR: SLIGHT
RBC # BLD AUTO: 3.85 10E6/UL (ref 3.8–5.2)
RBC AGGLUT BLD QL: ABNORMAL
RBC MORPH BLD: ABNORMAL
RETICS # AUTO: 0.09 10E6/UL (ref 0.03–0.1)
RETICS/RBC NFR AUTO: 2.3 % (ref 0.5–2)
ROULEAUX BLD QL SMEAR: ABNORMAL
SICKLE CELLS BLD QL SMEAR: ABNORMAL
SMUDGE CELLS BLD QL SMEAR: ABNORMAL
SPHEROCYTES BLD QL SMEAR: ABNORMAL
STOMATOCYTES BLD QL SMEAR: ABNORMAL
TARGETS BLD QL SMEAR: ABNORMAL
TOXIC GRANULES BLD QL SMEAR: ABNORMAL
VARIANT LYMPHS BLD QL SMEAR: ABNORMAL
WBC # BLD AUTO: 5.6 10E3/UL (ref 4–11)

## 2024-03-12 PROCEDURE — 85025 COMPLETE CBC W/AUTO DIFF WBC: CPT | Performed by: STUDENT IN AN ORGANIZED HEALTH CARE EDUCATION/TRAINING PROGRAM

## 2024-03-12 PROCEDURE — 82728 ASSAY OF FERRITIN: CPT | Performed by: STUDENT IN AN ORGANIZED HEALTH CARE EDUCATION/TRAINING PROGRAM

## 2024-03-12 PROCEDURE — 84466 ASSAY OF TRANSFERRIN: CPT | Performed by: STUDENT IN AN ORGANIZED HEALTH CARE EDUCATION/TRAINING PROGRAM

## 2024-03-12 PROCEDURE — 83540 ASSAY OF IRON: CPT | Performed by: STUDENT IN AN ORGANIZED HEALTH CARE EDUCATION/TRAINING PROGRAM

## 2024-03-12 PROCEDURE — 99214 OFFICE O/P EST MOD 30 MIN: CPT | Performed by: STUDENT IN AN ORGANIZED HEALTH CARE EDUCATION/TRAINING PROGRAM

## 2024-03-12 PROCEDURE — 36415 COLL VENOUS BLD VENIPUNCTURE: CPT | Performed by: STUDENT IN AN ORGANIZED HEALTH CARE EDUCATION/TRAINING PROGRAM

## 2024-03-12 PROCEDURE — 85045 AUTOMATED RETICULOCYTE COUNT: CPT | Performed by: STUDENT IN AN ORGANIZED HEALTH CARE EDUCATION/TRAINING PROGRAM

## 2024-03-12 NOTE — PROGRESS NOTES
Assessment & Plan     Iron deficiency anemia, unspecified iron deficiency anemia type  Has been taking oral iron intermittently. History of STEFFANY that required iron infusions before. Discussed dietary sources of iron. Will recheck her labs and then can consider iron infusions in the future.   - Hemoglobin; Future  - Iron & Iron Binding Capacity; Future  - Ferritin; Future  - Transferrin; Future  - Iron & Iron Binding Capacity  - Ferritin  - Transferrin  - CBC with Platelets & Differential; Future  - CBC with Platelets & Differential    Heart palpitations  LOZA  Patient is presenting with continue LOZA. She was seen previously and found to have a hemoglobin of 7. She was directed to the ED and received a blood transfusion. She feels a pressure in her chest with heavy activity. With her history of anemia that could be the cause but she is worried about heart and lungs so will get stress testing as she gets symptoms when she is doing activity and other testing hasn't picked up any abnormalities.   - Exercise Stress w/PFT's - Adult; Future                  No follow-ups on file.    Cole Marks is a 38 year old, presenting for the following health issues:  Shortness of Breath (Walk up stair, carry a load of laudry and when do light exercise, ) and Chest Pain (Sharp pain )      3/12/2024    11:27 AM   Additional Questions   Roomed by ML   Accompanied by self         3/12/2024    Information    services provided? No     HPI       Chest Pain  Onset/Duration: mid chest, sternal  Description:   Location: middle of the chest  Character: tight and heavy  Radiation: No  Duration: intermittent   Intensity: mild  Progression of Symptoms: intermittent  Accompanying Signs & Symptoms:  Shortness of breath: No  Sweating: No  Nausea/vomiting: No  Lightheadedness: No  Palpitations: YES  Fever/Chills: No  Cough: No           Heartburn: No  Precipitating factors:   Worse with exertion: YES  Worse with deep  "breaths: No           Related to eating: No           Better with burping: No  Alleviating factors: resting  Therapies tried and outcome: rest/inactivity            Objective    /71 (BP Location: Left arm, Patient Position: Sitting, Cuff Size: Adult Regular)   Pulse 86   Temp 98.6  F (37  C) (Oral)   Resp 16   Ht 1.477 m (4' 10.15\")   Wt 61.2 kg (135 lb)   LMP 02/20/2024 (Approximate)   SpO2 100%   BMI 28.07 kg/m    Body mass index is 28.07 kg/m .  Physical Exam  HENT:      Head: Normocephalic.      Nose: Nose normal.      Mouth/Throat:      Mouth: Mucous membranes are moist.   Eyes:      Extraocular Movements: Extraocular movements intact.      Conjunctiva/sclera: Conjunctivae normal.   Cardiovascular:      Rate and Rhythm: Normal rate and regular rhythm.   Pulmonary:      Effort: Pulmonary effort is normal.      Breath sounds: Normal breath sounds.   Abdominal:      Palpations: Abdomen is soft.   Musculoskeletal:      Cervical back: Normal range of motion.   Skin:     General: Skin is warm.      Capillary Refill: Capillary refill takes less than 2 seconds.   Neurological:      General: No focal deficit present.      Mental Status: She is alert.                    Signed Electronically by: Mo Noel MD    "

## 2024-03-13 LAB
FERRITIN SERPL-MCNC: 3 NG/ML (ref 6–175)
IRON BINDING CAPACITY (ROCHE): 508 UG/DL (ref 240–430)
IRON SATN MFR SERPL: 2 % (ref 15–46)
IRON SERPL-MCNC: 10 UG/DL (ref 37–145)
TRANSFERRIN SERPL-MCNC: 405 MG/DL (ref 200–360)

## 2024-04-01 NOTE — PROGRESS NOTES
[facility] H&P Note:  Date: Apr 2, 2024    Patient Name: Kendrick Jacobs  MRN: [id number]    CHIEF COMPLAINT: ganglion cyst, right hand    Dominant Hand: right  Occupation: Stay-at-home mom      HPI:  Ms. Kendrick Jacobs is a 38 year old female right hand dominant who presents with ganglion cyst, right dorsal wrist.  This has been present for 1-2 years with no known injury or trauma. It has been increasing in size. It was initially softer to touch but is now hard/ firm. It is not painful and does not restrict her ROM. She was previously seen by Dr. Cancino and received a right wrist dorsal carpal ganglion cyst aspiration and steroid injection on 8/21/23.  Cyst returned quickly and she feels like it increased in size and became hard after this. She would like to discuss excision of the cyst      PMH  Diabetes: no  Thyroid Problems: no  Smoking: no      PAST MEDICAL HISTORY:  Past Medical History:   Diagnosis Date    NO ACTIVE PROBLEMS        PAST SURGICAL HISTORY:  Past Surgical History:   Procedure Laterality Date    NO HISTORY OF SURGERY         MEDICATIONS:  Current Outpatient Medications   Medication    acetaminophen (TYLENOL) 500 MG tablet    diclofenac (VOLTAREN) 1 % topical gel    ferrous sulfate (FEROSUL) 325 (65 Fe) MG tablet    hydrocortisone 2.5 % cream    ibuprofen (ADVIL/MOTRIN) 400 MG tablet    Prenatal Vit-Fe Fumarate-FA (PRENATAL VITAMIN) 27-0.8 MG TABS     Current Facility-Administered Medications   Medication    1 mL bupivacaine (MARCAINE) preservative free injection 0.25% (10 mL vial)    lidocaine 1 % injection 1 mL    triamcinolone (KENALOG-40) injection 40 mg       ALLERGIES:     Allergies   Allergen Reactions    Seafood Swelling and Difficulty breathing       FAMILY HISTORY:  No pertinent family history    SOCIAL HISTORY:  Social History     Tobacco Use    Smoking status: Former    Smokeless tobacco: Never   Vaping Use    Vaping Use: Never used   Substance Use Topics     Alcohol use: Not Currently    Drug use: No       The patient's past medical, family, and social history was reviewed and confirmed.    REVIEW OF SYMPTOMS:      General: Negative   Eyes: Negative   Ear, Nose and Throat: Negative   Respiratory: Negative   Cardiovascular: Negative   Gastrointestinal: Negative   Genito-urinary: Negative   Musculoskeletal: Negative  Neurological: Negative   Psychological: Negative  HEME: Negative   ENDO: Negative   SKIN: Negative    VITALS:  There were no vitals filed for this visit.    EXAM:  General: NAD, A&Ox3  HEENT: NC/AT  CV: RRR by peripheral pulse  Pulmonary: Non-labored breathing on RA  RUE:  1 cm firm mobile subcutaneous mass over the dorsal central wrist consistent with ganglion cyst  Small hyperpigmented scar at site of prior aspiration and injection  Full wrist range of motion  Cyst is nontender  Intact EPL, FPL, hand intrinsics  Sensation intact to light touch median, radial, ulnar nerve distributions  Warm well-perfused, capillary refill less than 2 seconds      IMAGING:    X-ray right wrist 8/21/2023 demonstrates no bony abnormality or instability.  Soft tissue shadow seen    I have personally reviewed the above images and labs.         IMPRESSION AND RECOMMENDATIONS:  Ms. Kendrick Jacobs is a 38 year old female right hand dominant with right dorsal wrist ganglion cyst.    I discussed the diagnosis, prognosis, and treatment options with the patient.    Cyst has persisted for about 2 years, and recurred after prior aspiration and steroid injection.  She therefore would like to have the cyst excised.    The indications for surgery were discussed with the patient. The benefits, risks, and alternatives of operative management were discussed in detail with the patient. The patient understands that the risks of surgery include, but are not limited to: infection, bleeding, injury to nearby structures (such as nerves, blood vessels, and tendons), recurrence, need for additional  surgery, pain, stiffness, scarring, need for rehabilitation, and anesthetic complications.  Patient expressed understanding and elected to proceed with surgery. All questions were answered to the patient's satisfaction.    Case request placed.  [facility] [place]    Franicsco Zhou MD    Hand, Upper Extremity & Microvascular Surgery  Department of Orthopaedic Surgery  [facility]

## 2024-04-02 ENCOUNTER — OFFICE VISIT (OUTPATIENT)
Dept: ORTHOPEDICS | Facility: CLINIC | Age: 39
End: 2024-04-02
Payer: COMMERCIAL

## 2024-04-02 VITALS — WEIGHT: 135 LBS | HEIGHT: 58 IN | BODY MASS INDEX: 28.34 KG/M2

## 2024-04-02 DIAGNOSIS — M67.431 GANGLION CYST OF DORSUM OF RIGHT WRIST: Primary | ICD-10-CM

## 2024-04-02 PROCEDURE — 99214 OFFICE O/P EST MOD 30 MIN: CPT | Performed by: STUDENT IN AN ORGANIZED HEALTH CARE EDUCATION/TRAINING PROGRAM

## 2024-04-02 ASSESSMENT — PAIN SCALES - GENERAL: PAINLEVEL: NO PAIN (0)

## 2024-04-02 NOTE — LETTER
4/2/2024         RE: Kendrick Jacobs  [address] [place] [address]        Dear Colleague,    Thank you for referring your patient, Kendrick Jacobs, to the [facility] [place]. Please see a copy of my visit note below.    Orthopaedic Surgery Hand and Upper Extremity Clinic H&P Note:  Date: Apr 2, 2024    Patient Name: Kendrick Jacobs  MRN: [id number]    CHIEF COMPLAINT: ganglion cyst, right hand    Dominant Hand: right  Occupation: Stay-at-home mom      HPI:  Ms. Kendrick Jacobs is a 38 year old female right hand dominant who presents with ganglion cyst, right dorsal wrist.  This has been present for 1-2 years with no known injury or trauma. It has been increasing in size. It was initially softer to touch but is now hard/ firm. It is not painful and does not restrict her ROM. She was previously seen by Dr. Cancino and received a right wrist dorsal carpal ganglion cyst aspiration and steroid injection on 8/21/23.  Cyst returned quickly and she feels like it increased in size and became hard after this. She would like to discuss excision of the cyst      PMH  Diabetes: no  Thyroid Problems: no  Smoking: no      PAST MEDICAL HISTORY:  Past Medical History:   Diagnosis Date     NO ACTIVE PROBLEMS        PAST SURGICAL HISTORY:  Past Surgical History:   Procedure Laterality Date     NO HISTORY OF SURGERY         MEDICATIONS:  Current Outpatient Medications   Medication     acetaminophen (TYLENOL) 500 MG tablet     diclofenac (VOLTAREN) 1 % topical gel     ferrous sulfate (FEROSUL) 325 (65 Fe) MG tablet     hydrocortisone 2.5 % cream     ibuprofen (ADVIL/MOTRIN) 400 MG tablet     Prenatal Vit-Fe Fumarate-FA (PRENATAL VITAMIN) 27-0.8 MG TABS     Current Facility-Administered Medications   Medication     1 mL bupivacaine (MARCAINE) preservative free injection 0.25% (10 mL vial)     lidocaine 1 % injection 1 mL     triamcinolone (KENALOG-40) injection 40 mg       ALLERGIES:     Allergies   Allergen Reactions      Seafood Swelling and Difficulty breathing       FAMILY HISTORY:  No pertinent family history    SOCIAL HISTORY:  Social History     Tobacco Use     Smoking status: Former     Smokeless tobacco: Never   Vaping Use     Vaping Use: Never used   Substance Use Topics     Alcohol use: Not Currently     Drug use: No       The patient's past medical, family, and social history was reviewed and confirmed.    REVIEW OF SYMPTOMS:      General: Negative   Eyes: Negative   Ear, Nose and Throat: Negative   Respiratory: Negative   Cardiovascular: Negative   Gastrointestinal: Negative   Genito-urinary: Negative   Musculoskeletal: Negative  Neurological: Negative   Psychological: Negative  HEME: Negative   ENDO: Negative   SKIN: Negative    VITALS:  There were no vitals filed for this visit.    EXAM:  General: NAD, A&Ox3  HEENT: NC/AT  CV: RRR by peripheral pulse  Pulmonary: Non-labored breathing on RA  RUE:  1 cm firm mobile subcutaneous mass over the dorsal central wrist consistent with ganglion cyst  Small hyperpigmented scar at site of prior aspiration and injection  Full wrist range of motion  Cyst is nontender  Intact EPL, FPL, hand intrinsics  Sensation intact to light touch median, radial, ulnar nerve distributions  Warm well-perfused, capillary refill less than 2 seconds      IMAGING:    X-ray right wrist 8/21/2023 demonstrates no bony abnormality or instability.  Soft tissue shadow seen    I have personally reviewed the above images and labs.         IMPRESSION AND RECOMMENDATIONS:  Ms. Kendrick Jacobs is a 38 year old female right hand dominant with right dorsal wrist ganglion cyst.    I discussed the diagnosis, prognosis, and treatment options with the patient.    Cyst has persisted for about 2 years, and recurred after prior aspiration and steroid injection.  She therefore would like to have the cyst excised.    The indications for surgery were discussed with the patient. The benefits, risks, and alternatives of operative  management were discussed in detail with the patient. The patient understands that the risks of surgery include, but are not limited to: infection, bleeding, injury to nearby structures (such as nerves, blood vessels, and tendons), recurrence, need for additional surgery, pain, stiffness, scarring, need for rehabilitation, and anesthetic complications.  Patient expressed understanding and elected to proceed with surgery. All questions were answered to the patient's satisfaction.    Case request placed.  [facility] [place]    Francisco Zhou MD    Hand, Upper Extremity & Microvascular Surgery  Department of Orthopaedic Surgery  [facility]           Again, thank you for allowing me to participate in the care of your patient.        Sincerely,        Francisco Zhou MD

## 2024-04-10 ENCOUNTER — OFFICE VISIT (OUTPATIENT)
Dept: FAMILY MEDICINE | Facility: CLINIC | Age: 39
End: 2024-04-10
Payer: COMMERCIAL

## 2024-04-10 ENCOUNTER — TELEPHONE (OUTPATIENT)
Dept: ORTHOPEDICS | Facility: CLINIC | Age: 39
End: 2024-04-10

## 2024-04-10 VITALS
SYSTOLIC BLOOD PRESSURE: 110 MMHG | TEMPERATURE: 98.3 F | HEIGHT: 58 IN | WEIGHT: 137.8 LBS | RESPIRATION RATE: 18 BRPM | HEART RATE: 83 BPM | OXYGEN SATURATION: 95 % | DIASTOLIC BLOOD PRESSURE: 78 MMHG | BODY MASS INDEX: 28.92 KG/M2

## 2024-04-10 DIAGNOSIS — N94.6 DYSMENORRHEA: ICD-10-CM

## 2024-04-10 DIAGNOSIS — D64.9 SEVERE ANEMIA: Primary | ICD-10-CM

## 2024-04-10 PROCEDURE — 99214 OFFICE O/P EST MOD 30 MIN: CPT | Performed by: STUDENT IN AN ORGANIZED HEALTH CARE EDUCATION/TRAINING PROGRAM

## 2024-04-10 RX ORDER — IBUPROFEN 600 MG/1
600 TABLET, FILM COATED ORAL EVERY 6 HOURS PRN
Qty: 30 TABLET | Refills: 0 | Status: SHIPPED | OUTPATIENT
Start: 2024-04-10

## 2024-04-10 RX ORDER — NORETHINDRONE ACETATE 5 MG
5 TABLET ORAL
COMMUNITY
Start: 2024-03-18

## 2024-04-10 RX ORDER — NORETHINDRONE ACETATE 5 MG
TABLET ORAL
Qty: 120 TABLET | Refills: 1 | Status: SHIPPED | OUTPATIENT
Start: 2024-04-10

## 2024-04-10 NOTE — LETTER
April 10, 2024      Kendrick Jacobs  [address] [place] [address]              Dear Kendrick,    We have been trying to reach your via phone, unfortunately your mailbox has been full.      I am reaching out to you today to see if you were ready to schedule your surgery with DR. BERNABE BROWN for EXCISION GANGLION CYST, WRIST RIGHT.    I can be reached at [phone] Monday thru Friday 8am to 4:30pm.    I look forward to hearing from you.    Katerin SULLIVAN  [facility]  Surgery Schedule Coordinator

## 2024-04-10 NOTE — TELEPHONE ENCOUNTER
Mailed letter to patient to see if she was ready to schedule surgery with Dr. Francisco Zhou for Excision Ganglion Cyst Wrist. Right

## 2024-04-10 NOTE — Clinical Note
Hey,   I have ordered the last iron infusion for the patient, she has been getting them done at [facility]. The number for the infusion center is [phone] . Can you call and see how we need to send them the order?

## 2024-04-13 RX ORDER — EPINEPHRINE 1 MG/ML
0.3 INJECTION, SOLUTION, CONCENTRATE INTRAVENOUS EVERY 5 MIN PRN
Status: CANCELLED | OUTPATIENT
Start: 2024-04-20

## 2024-04-13 RX ORDER — MEPERIDINE HYDROCHLORIDE 25 MG/ML
25 INJECTION INTRAMUSCULAR; INTRAVENOUS; SUBCUTANEOUS EVERY 30 MIN PRN
Status: CANCELLED | OUTPATIENT
Start: 2024-04-20

## 2024-04-13 RX ORDER — HEPARIN SODIUM,PORCINE 10 UNIT/ML
5-20 VIAL (ML) INTRAVENOUS DAILY PRN
Status: CANCELLED | OUTPATIENT
Start: 2024-04-20

## 2024-04-13 RX ORDER — DIPHENHYDRAMINE HYDROCHLORIDE 50 MG/ML
50 INJECTION INTRAMUSCULAR; INTRAVENOUS
Status: CANCELLED
Start: 2024-04-20

## 2024-04-13 RX ORDER — ALBUTEROL SULFATE 0.83 MG/ML
2.5 SOLUTION RESPIRATORY (INHALATION)
Status: CANCELLED | OUTPATIENT
Start: 2024-04-20

## 2024-04-13 RX ORDER — ALBUTEROL SULFATE 90 UG/1
1-2 AEROSOL, METERED RESPIRATORY (INHALATION)
Status: CANCELLED
Start: 2024-04-20

## 2024-04-13 RX ORDER — METHYLPREDNISOLONE SODIUM SUCCINATE 125 MG/2ML
125 INJECTION, POWDER, LYOPHILIZED, FOR SOLUTION INTRAMUSCULAR; INTRAVENOUS
Status: CANCELLED
Start: 2024-04-20

## 2024-04-13 RX ORDER — HEPARIN SODIUM (PORCINE) LOCK FLUSH IV SOLN 100 UNIT/ML 100 UNIT/ML
5 SOLUTION INTRAVENOUS
Status: CANCELLED | OUTPATIENT
Start: 2024-04-20

## 2024-04-13 NOTE — PROGRESS NOTES
"  Assessment & Plan       Anemia  Dysmenorrhea  On 3/12 patients hemoglobin was 6.5. She was directed to the ED and there was given some blood transfusions. And started on iron infusions. She has completed 2 of them.   - norethindrone (AYGESTIN) 5 MG tablet; Take twice a day for 1 month and then increase to three times a day  - ibuprofen (ADVIL/MOTRIN) 600 MG tablet; Take 1 tablet (600 mg) by mouth every 6 hours as needed for moderate pain  - Will order her a 3rd in the venofer series.                   No follow-ups on file.    Cole Marks is a 38 year old, presenting for the following health issues:  iron infusion  (New order ) and Menstrual Problem (Heavy peiod, during period have bloating, back pain )    HPI   Patient has had prolonged and heavy bleeding. She was seen in the ED after having a low hemoglobin. She was started on progesterone by OBGYN from the ED. She has still has some bleeding for a few weeks. She increased the dose from 5mg to 10 mg and did not get resolution. She is not interested in IUD or surgery at this time. She reports having significant pain with her periods and is taking large amounts of ibuprofen.                 Objective    /78   Pulse 83   Temp 98.3  F (36.8  C) (Oral)   Resp 18   Ht 1.48 m (4' 10.27\")   Wt 62.5 kg (137 lb 12.8 oz)   LMP 03/28/2024 (Approximate)   SpO2 95%   BMI 28.54 kg/m    Body mass index is 28.54 kg/m .  Physical Exam  HENT:      Head: Normocephalic.      Nose: Nose normal.      Mouth/Throat:      Mouth: Mucous membranes are moist.   Eyes:      Extraocular Movements: Extraocular movements intact.      Conjunctiva/sclera: Conjunctivae normal.   Cardiovascular:      Rate and Rhythm: Normal rate and regular rhythm.   Pulmonary:      Effort: Pulmonary effort is normal.      Breath sounds: Normal breath sounds.   Abdominal:      Palpations: Abdomen is soft.   Musculoskeletal:      Cervical back: Normal range of motion.   Skin:     General: Skin is " warm.      Capillary Refill: Capillary refill takes less than 2 seconds.   Neurological:      General: No focal deficit present.      Mental Status: She is alert.                    Signed Electronically by: Mo Noel MD

## 2024-04-15 ENCOUNTER — TELEPHONE (OUTPATIENT)
Dept: FAMILY MEDICINE | Facility: CLINIC | Age: 39
End: 2024-04-15
Payer: COMMERCIAL

## 2024-04-15 NOTE — TELEPHONE ENCOUNTER
Spoke with [facility] infusion center regarding orders. They state orders for iron infusion can be faxed to [phone]. Once they receive the fax, they will reach out to the pt to get scheduled. They confirmed they have seen pt for iron infusions previously.  LEANNA Art, BSN

## 2024-04-24 ENCOUNTER — TELEPHONE (OUTPATIENT)
Dept: ORTHOPEDICS | Facility: CLINIC | Age: 39
End: 2024-04-24
Payer: COMMERCIAL

## 2024-04-24 NOTE — TELEPHONE ENCOUNTER
This is for Dr. Francisco Zhou.    04/10/24 mailbox is still full, mailed letter, pend 1wk  04/03 mailbox full no mychart      Have not heard back from patient.    Voiding case.

## 2024-05-01 ENCOUNTER — INFUSION THERAPY VISIT (OUTPATIENT)
Dept: INFUSION THERAPY | Facility: HOSPITAL | Age: 39
End: 2024-05-01
Payer: COMMERCIAL

## 2024-05-01 VITALS
HEART RATE: 78 BPM | RESPIRATION RATE: 16 BRPM | OXYGEN SATURATION: 100 % | DIASTOLIC BLOOD PRESSURE: 74 MMHG | SYSTOLIC BLOOD PRESSURE: 115 MMHG | TEMPERATURE: 98.2 F

## 2024-05-01 DIAGNOSIS — D64.9 SEVERE ANEMIA: Primary | ICD-10-CM

## 2024-05-01 PROCEDURE — 96365 THER/PROPH/DIAG IV INF INIT: CPT

## 2024-05-01 PROCEDURE — 250N000011 HC RX IP 250 OP 636: Performed by: STUDENT IN AN ORGANIZED HEALTH CARE EDUCATION/TRAINING PROGRAM

## 2024-05-01 PROCEDURE — 258N000003 HC RX IP 258 OP 636: Performed by: STUDENT IN AN ORGANIZED HEALTH CARE EDUCATION/TRAINING PROGRAM

## 2024-05-01 RX ORDER — DIPHENHYDRAMINE HYDROCHLORIDE 50 MG/ML
50 INJECTION INTRAMUSCULAR; INTRAVENOUS
Start: 2024-05-03

## 2024-05-01 RX ORDER — METHYLPREDNISOLONE SODIUM SUCCINATE 125 MG/2ML
125 INJECTION, POWDER, LYOPHILIZED, FOR SOLUTION INTRAMUSCULAR; INTRAVENOUS
Status: DISCONTINUED | OUTPATIENT
Start: 2024-05-01 | End: 2024-05-01 | Stop reason: HOSPADM

## 2024-05-01 RX ORDER — METHYLPREDNISOLONE SODIUM SUCCINATE 125 MG/2ML
125 INJECTION, POWDER, LYOPHILIZED, FOR SOLUTION INTRAMUSCULAR; INTRAVENOUS
Start: 2024-05-03

## 2024-05-01 RX ORDER — HEPARIN SODIUM,PORCINE 10 UNIT/ML
5-20 VIAL (ML) INTRAVENOUS DAILY PRN
OUTPATIENT
Start: 2024-05-03

## 2024-05-01 RX ORDER — MEPERIDINE HYDROCHLORIDE 25 MG/ML
25 INJECTION INTRAMUSCULAR; INTRAVENOUS; SUBCUTANEOUS EVERY 30 MIN PRN
OUTPATIENT
Start: 2024-05-03

## 2024-05-01 RX ORDER — ALBUTEROL SULFATE 90 UG/1
1-2 AEROSOL, METERED RESPIRATORY (INHALATION)
Start: 2024-05-03

## 2024-05-01 RX ORDER — HEPARIN SODIUM (PORCINE) LOCK FLUSH IV SOLN 100 UNIT/ML 100 UNIT/ML
5 SOLUTION INTRAVENOUS
OUTPATIENT
Start: 2024-05-03

## 2024-05-01 RX ORDER — EPINEPHRINE 1 MG/ML
0.3 INJECTION, SOLUTION INTRAMUSCULAR; SUBCUTANEOUS EVERY 5 MIN PRN
Status: DISCONTINUED | OUTPATIENT
Start: 2024-05-01 | End: 2024-05-01 | Stop reason: HOSPADM

## 2024-05-01 RX ORDER — DIPHENHYDRAMINE HYDROCHLORIDE 50 MG/ML
50 INJECTION INTRAMUSCULAR; INTRAVENOUS
Status: DISCONTINUED | OUTPATIENT
Start: 2024-05-01 | End: 2024-05-01 | Stop reason: HOSPADM

## 2024-05-01 RX ORDER — EPINEPHRINE 1 MG/ML
0.3 INJECTION, SOLUTION INTRAMUSCULAR; SUBCUTANEOUS EVERY 5 MIN PRN
OUTPATIENT
Start: 2024-05-03

## 2024-05-01 RX ORDER — ALBUTEROL SULFATE 0.83 MG/ML
2.5 SOLUTION RESPIRATORY (INHALATION)
OUTPATIENT
Start: 2024-05-03

## 2024-05-01 RX ADMIN — SODIUM CHLORIDE 250 ML: 9 INJECTION, SOLUTION INTRAVENOUS at 13:11

## 2024-05-01 RX ADMIN — IRON SUCROSE 300 MG: 20 INJECTION, SOLUTION INTRAVENOUS at 13:11

## 2024-05-01 NOTE — PROGRESS NOTES
Infusion Nursing Note:  Kendrick Jacobs presents today for Venofer 1/3 .    Patient seen by provider today: No   present during visit today: Not Applicable.    Note: Patient arrives today via ambulatory for her Iron infusion.  Patient is alert and oriented and VSS.  Patient states she has had iron in the past at a different clinic.  Patient educated on her plan of care and elects to proceed.  Patient given written materials for iron sucrose.      Intravenous Access:  Peripheral IV placed.    Treatment Conditions:  Not Applicable.      Post Infusion Assessment:  Patient tolerated infusion without incident.  Site patent and intact, free from redness, edema or discomfort.  No evidence of extravasations.  Access discontinued per protocol.       Discharge Plan:   Discharge instructions reviewed with: Patient.  Patient and/or family verbalized understanding of discharge instructions and all questions answered.  Copy of AVS reviewed with patient and/or family.  Patient will return 5-3-24 for next appointment.  Patient discharged in stable condition accompanied by: self.  Departure Mode: Ambulatory.      ROBSON GONZALEZ RN

## 2024-07-22 ENCOUNTER — TELEPHONE (OUTPATIENT)
Dept: ORTHOPEDICS | Facility: CLINIC | Age: 39
End: 2024-07-22
Payer: COMMERCIAL

## 2024-07-22 NOTE — TELEPHONE ENCOUNTER
Patient has been scheduled for surgery. Details are below.    Date of Surgery: 08/16/24    Approximate Arrival Time: SURGERY CENTER WILL CALL 3/4 DAYS PRIOR TO CONFIRM A TIME   Surgeon:ZAIDA TRIPP PROVIDERS: Dr. Francisco Zhou    Procedure: EXCISION GANGLION CYST    Location[facility]: [facility] [address]  Surgery Consult: NA  PreOp Physical: CALLING PCP  PostOp: 08/26/24 9:25AM, KALYAN GONZALEZ  Packet Mailed/MyChart Sent: YES  Added to [place]: YES    Spoke to: SHANI

## 2024-08-26 ENCOUNTER — TELEPHONE (OUTPATIENT)
Dept: ORTHOPEDICS | Facility: CLINIC | Age: 39
End: 2024-08-26

## 2024-08-28 ENCOUNTER — TELEPHONE (OUTPATIENT)
Dept: ORTHOPEDICS | Facility: CLINIC | Age: 39
End: 2024-08-28
Payer: COMMERCIAL

## 2024-08-28 NOTE — TELEPHONE ENCOUNTER
There is no consent to communicate on file for patient.     Patient missed their 8/26/24 appointment with Lawson Plasencia PA-C for a post op from 8/15/24 ganglion cyst excision of right wrist.     2nd attempt to reach patient. Left voicemail asking for a return call regarding an appointment. Scheduling number was provided.     NEYDA Hernandez RN

## 2024-08-28 NOTE — TELEPHONE ENCOUNTER
Other: Pt calling back to [facility] wants to hold off on post appointment for now, has other things going on now     Could we send this information to you in [email or web address] or would you prefer to receive a phone call?:   No preference   Okay to leave a detailed message?: Yes at Cell number on file:    Telephone Information:   Mobile [phone]

## 2024-08-29 NOTE — TELEPHONE ENCOUNTER
Upon review with provider and surgery scheduler patient did not have surgery. Closing encounter. Patient will reach out at a later time if they would like the cyst removed.      Germain Mooney ATC

## 2025-08-06 ENCOUNTER — TELEPHONE (OUTPATIENT)
Dept: FAMILY MEDICINE | Facility: CLINIC | Age: 40
End: 2025-08-06